# Patient Record
Sex: FEMALE | Race: WHITE | NOT HISPANIC OR LATINO | Employment: OTHER | URBAN - NONMETROPOLITAN AREA
[De-identification: names, ages, dates, MRNs, and addresses within clinical notes are randomized per-mention and may not be internally consistent; named-entity substitution may affect disease eponyms.]

---

## 2017-11-08 ENCOUNTER — HOSPITAL ENCOUNTER (INPATIENT)
Facility: HOSPITAL | Age: 57
LOS: 4 days | Discharge: HOME HEALTH | End: 2017-11-12
Attending: FAMILY MEDICINE | Admitting: FAMILY MEDICINE

## 2017-11-08 ENCOUNTER — APPOINTMENT (OUTPATIENT)
Dept: GENERAL RADIOLOGY | Facility: HOSPITAL | Age: 57
End: 2017-11-08

## 2017-11-08 ENCOUNTER — APPOINTMENT (OUTPATIENT)
Dept: ULTRASOUND IMAGING | Facility: HOSPITAL | Age: 57
End: 2017-11-08

## 2017-11-08 ENCOUNTER — APPOINTMENT (OUTPATIENT)
Dept: CARDIOLOGY | Facility: HOSPITAL | Age: 57
End: 2017-11-08
Attending: INTERNAL MEDICINE

## 2017-11-08 DIAGNOSIS — Z74.09 IMPAIRED PHYSICAL MOBILITY: ICD-10-CM

## 2017-11-08 DIAGNOSIS — R26.89 IMPAIRED GAIT AND MOBILITY: ICD-10-CM

## 2017-11-08 DIAGNOSIS — Z78.9 DECREASED ACTIVITIES OF DAILY LIVING (ADL): ICD-10-CM

## 2017-11-08 PROBLEM — R55 SYNCOPE: Status: ACTIVE | Noted: 2017-11-08

## 2017-11-08 LAB
ALBUMIN SERPL-MCNC: 3.4 G/DL (ref 3.4–4.8)
ALBUMIN/GLOB SERPL: 1.1 G/DL (ref 1.1–1.8)
ALP SERPL-CCNC: 98 U/L (ref 38–126)
ALT SERPL W P-5'-P-CCNC: 40 U/L (ref 9–52)
ANION GAP SERPL CALCULATED.3IONS-SCNC: 12 MMOL/L (ref 5–15)
ARTICHOKE IGE QN: 128 MG/DL (ref 1–129)
AST SERPL-CCNC: 34 U/L (ref 14–36)
BASOPHILS # BLD AUTO: 0.02 10*3/MM3 (ref 0–0.2)
BASOPHILS NFR BLD AUTO: 0.3 % (ref 0–2)
BH CV ECHO MEAS - % IVS THICK: 17.1 %
BH CV ECHO MEAS - % LVPW THICK: 18.2 %
BH CV ECHO MEAS - ACS: 1.9 CM
BH CV ECHO MEAS - AO MAX PG (FULL): 5.8 MMHG
BH CV ECHO MEAS - AO MAX PG: 10.3 MMHG
BH CV ECHO MEAS - AO MEAN PG (FULL): 3.6 MMHG
BH CV ECHO MEAS - AO MEAN PG: 5.8 MMHG
BH CV ECHO MEAS - AO ROOT AREA: 4.7 CM^2
BH CV ECHO MEAS - AO ROOT DIAM: 2.4 CM
BH CV ECHO MEAS - AO V2 MAX: 160.7 CM/SEC
BH CV ECHO MEAS - AO V2 MEAN: 114.6 CM/SEC
BH CV ECHO MEAS - AO V2 VTI: 41.1 CM
BH CV ECHO MEAS - AVA(I,A): 2.1 CM^2
BH CV ECHO MEAS - AVA(I,D): 2.1 CM^2
BH CV ECHO MEAS - AVA(V,A): 1.9 CM^2
BH CV ECHO MEAS - AVA(V,D): 1.9 CM^2
BH CV ECHO MEAS - EDV(CUBED): 114.1 ML
BH CV ECHO MEAS - EDV(TEICH): 110.1 ML
BH CV ECHO MEAS - EF(CUBED): 64.4 %
BH CV ECHO MEAS - EF(MOD-SP4): 60 %
BH CV ECHO MEAS - EF(TEICH): 55.8 %
BH CV ECHO MEAS - ESV(CUBED): 40.6 ML
BH CV ECHO MEAS - ESV(TEICH): 48.7 ML
BH CV ECHO MEAS - FS: 29.1 %
BH CV ECHO MEAS - IVS/LVPW: 0.91
BH CV ECHO MEAS - IVSD: 0.96 CM
BH CV ECHO MEAS - IVSS: 1.1 CM
BH CV ECHO MEAS - LA DIMENSION: 4.2 CM
BH CV ECHO MEAS - LA/AO: 1.7
BH CV ECHO MEAS - LV MASS(C)D: 174.5 GRAMS
BH CV ECHO MEAS - LV MASS(C)S: 129.6 GRAMS
BH CV ECHO MEAS - LV MAX PG: 4.5 MMHG
BH CV ECHO MEAS - LV MEAN PG: 2.1 MMHG
BH CV ECHO MEAS - LV V1 MAX: 106.5 CM/SEC
BH CV ECHO MEAS - LV V1 MEAN: 67.6 CM/SEC
BH CV ECHO MEAS - LV V1 VTI: 30 CM
BH CV ECHO MEAS - LVIDD: 4.8 CM
BH CV ECHO MEAS - LVIDS: 3.4 CM
BH CV ECHO MEAS - LVOT AREA (M): 2.8 CM^2
BH CV ECHO MEAS - LVOT AREA: 2.9 CM^2
BH CV ECHO MEAS - LVOT DIAM: 1.9 CM
BH CV ECHO MEAS - LVPWD: 1.1 CM
BH CV ECHO MEAS - LVPWS: 1.2 CM
BH CV ECHO MEAS - MV A MAX VEL: 130.6 CM/SEC
BH CV ECHO MEAS - MV DEC SLOPE: 851.1 CM/SEC^2
BH CV ECHO MEAS - MV E MAX VEL: 141.7 CM/SEC
BH CV ECHO MEAS - MV E/A: 1.1
BH CV ECHO MEAS - MV MAX PG: 8.5 MMHG
BH CV ECHO MEAS - MV MEAN PG: 5.2 MMHG
BH CV ECHO MEAS - MV P1/2T MAX VEL: 151.3 CM/SEC
BH CV ECHO MEAS - MV P1/2T: 52.1 MSEC
BH CV ECHO MEAS - MV V2 MAX: 145.4 CM/SEC
BH CV ECHO MEAS - MV V2 MEAN: 108.6 CM/SEC
BH CV ECHO MEAS - MV V2 VTI: 40.1 CM
BH CV ECHO MEAS - MVA P1/2T LCG: 1.5 CM^2
BH CV ECHO MEAS - MVA(P1/2T): 4.2 CM^2
BH CV ECHO MEAS - MVA(VTI): 2.2 CM^2
BH CV ECHO MEAS - PA MAX PG: 12.3 MMHG
BH CV ECHO MEAS - PA V2 MAX: 175.1 CM/SEC
BH CV ECHO MEAS - PI END-D VEL: 108.6 CM/SEC
BH CV ECHO MEAS - RAP SYSTOLE: 5 MMHG
BH CV ECHO MEAS - RVDD: 2.5 CM
BH CV ECHO MEAS - RVSP: 47.1 MMHG
BH CV ECHO MEAS - SV(AO): 193.1 ML
BH CV ECHO MEAS - SV(CUBED): 73.5 ML
BH CV ECHO MEAS - SV(LVOT): 88 ML
BH CV ECHO MEAS - SV(TEICH): 61.4 ML
BH CV ECHO MEAS - TR MAX VEL: 324.4 CM/SEC
BILIRUB SERPL-MCNC: 0.3 MG/DL (ref 0.2–1.3)
BUN BLD-MCNC: 38 MG/DL (ref 7–21)
BUN/CREAT SERPL: 22.5 (ref 7–25)
CA-I BLD-MCNC: 4.4 MG/DL (ref 4.5–4.9)
CALCIUM SPEC-SCNC: 8.7 MG/DL (ref 8.4–10.2)
CHLORIDE SERPL-SCNC: 109 MMOL/L (ref 95–110)
CHOLEST SERPL-MCNC: 207 MG/DL (ref 0–199)
CK SERPL-CCNC: 105 U/L (ref 30–135)
CO2 SERPL-SCNC: 22 MMOL/L (ref 22–31)
CREAT BLD-MCNC: 1.69 MG/DL (ref 0.5–1)
DEPRECATED RDW RBC AUTO: 41.1 FL (ref 36.4–46.3)
EOSINOPHIL # BLD AUTO: 0.04 10*3/MM3 (ref 0–0.7)
EOSINOPHIL NFR BLD AUTO: 0.5 % (ref 0–7)
ERYTHROCYTE [DISTWIDTH] IN BLOOD BY AUTOMATED COUNT: 13.6 % (ref 11.5–14.5)
GFR SERPL CREATININE-BSD FRML MDRD: 31 ML/MIN/1.73 (ref 51–120)
GLOBULIN UR ELPH-MCNC: 3.2 GM/DL (ref 2.3–3.5)
GLUCOSE BLD-MCNC: 84 MG/DL (ref 60–100)
GLUCOSE BLDC GLUCOMTR-MCNC: 124 MG/DL (ref 70–130)
GLUCOSE BLDC GLUCOMTR-MCNC: 70 MG/DL (ref 70–130)
GLUCOSE BLDC GLUCOMTR-MCNC: 76 MG/DL (ref 70–130)
GLUCOSE BLDC GLUCOMTR-MCNC: 94 MG/DL (ref 70–130)
HBA1C MFR BLD: 6.5 % (ref 4–5.6)
HCT VFR BLD AUTO: 32.3 % (ref 35–45)
HDLC SERPL-MCNC: 50 MG/DL (ref 60–200)
HGB BLD-MCNC: 11.1 G/DL (ref 12–15.5)
HOLD SPECIMEN: NORMAL
IMM GRANULOCYTES # BLD: 0.02 10*3/MM3 (ref 0–0.02)
IMM GRANULOCYTES NFR BLD: 0.3 % (ref 0–0.5)
INR PPP: 1.07 (ref 0.8–1.2)
LDLC/HDLC SERPL: 2.76 {RATIO} (ref 0–3.22)
LV EF 2D ECHO EST: 55 %
LYMPHOCYTES # BLD AUTO: 1.55 10*3/MM3 (ref 0.6–4.2)
LYMPHOCYTES NFR BLD AUTO: 20.5 % (ref 10–50)
MAGNESIUM SERPL-MCNC: 2 MG/DL (ref 1.6–2.3)
MAXIMAL PREDICTED HEART RATE: 161 BPM
MCH RBC QN AUTO: 28.7 PG (ref 26.5–34)
MCHC RBC AUTO-ENTMCNC: 34.4 G/DL (ref 31.4–36)
MCV RBC AUTO: 83.5 FL (ref 80–98)
MONOCYTES # BLD AUTO: 0.62 10*3/MM3 (ref 0–0.9)
MONOCYTES NFR BLD AUTO: 8.2 % (ref 0–12)
NEUTROPHILS # BLD AUTO: 5.32 10*3/MM3 (ref 2–8.6)
NEUTROPHILS NFR BLD AUTO: 70.2 % (ref 37–80)
PLATELET # BLD AUTO: 184 10*3/MM3 (ref 150–450)
PMV BLD AUTO: 10.2 FL (ref 8–12)
POTASSIUM BLD-SCNC: 4.3 MMOL/L (ref 3.5–5.1)
PROT SERPL-MCNC: 6.6 G/DL (ref 6.3–8.6)
PROTHROMBIN TIME: 13.8 SECONDS (ref 11.1–15.3)
RBC # BLD AUTO: 3.87 10*6/MM3 (ref 3.77–5.16)
SODIUM BLD-SCNC: 143 MMOL/L (ref 137–145)
STRESS TARGET HR: 137 BPM
TRIGL SERPL-MCNC: 95 MG/DL (ref 20–199)
WBC NRBC COR # BLD: 7.57 10*3/MM3 (ref 3.2–9.8)

## 2017-11-08 PROCEDURE — 93880 EXTRACRANIAL BILAT STUDY: CPT

## 2017-11-08 PROCEDURE — 25010000002 ENOXAPARIN PER 10 MG: Performed by: INTERNAL MEDICINE

## 2017-11-08 PROCEDURE — 82607 VITAMIN B-12: CPT | Performed by: FAMILY MEDICINE

## 2017-11-08 PROCEDURE — 82746 ASSAY OF FOLIC ACID SERUM: CPT | Performed by: FAMILY MEDICINE

## 2017-11-08 PROCEDURE — 85025 COMPLETE CBC W/AUTO DIFF WBC: CPT | Performed by: INTERNAL MEDICINE

## 2017-11-08 PROCEDURE — 93306 TTE W/DOPPLER COMPLETE: CPT | Performed by: INTERNAL MEDICINE

## 2017-11-08 PROCEDURE — 83735 ASSAY OF MAGNESIUM: CPT | Performed by: INTERNAL MEDICINE

## 2017-11-08 PROCEDURE — 83550 IRON BINDING TEST: CPT | Performed by: FAMILY MEDICINE

## 2017-11-08 PROCEDURE — 80053 COMPREHEN METABOLIC PANEL: CPT | Performed by: INTERNAL MEDICINE

## 2017-11-08 PROCEDURE — 82962 GLUCOSE BLOOD TEST: CPT

## 2017-11-08 PROCEDURE — 82330 ASSAY OF CALCIUM: CPT | Performed by: INTERNAL MEDICINE

## 2017-11-08 PROCEDURE — 83036 HEMOGLOBIN GLYCOSYLATED A1C: CPT | Performed by: INTERNAL MEDICINE

## 2017-11-08 PROCEDURE — 93306 TTE W/DOPPLER COMPLETE: CPT

## 2017-11-08 PROCEDURE — 71020 HC CHEST PA AND LATERAL: CPT

## 2017-11-08 PROCEDURE — 85610 PROTHROMBIN TIME: CPT | Performed by: INTERNAL MEDICINE

## 2017-11-08 PROCEDURE — 82550 ASSAY OF CK (CPK): CPT | Performed by: INTERNAL MEDICINE

## 2017-11-08 PROCEDURE — 82728 ASSAY OF FERRITIN: CPT | Performed by: FAMILY MEDICINE

## 2017-11-08 PROCEDURE — 76775 US EXAM ABDO BACK WALL LIM: CPT

## 2017-11-08 PROCEDURE — 83540 ASSAY OF IRON: CPT | Performed by: FAMILY MEDICINE

## 2017-11-08 PROCEDURE — 25010000002 HYDRALAZINE PER 20 MG: Performed by: INTERNAL MEDICINE

## 2017-11-08 PROCEDURE — 80061 LIPID PANEL: CPT | Performed by: INTERNAL MEDICINE

## 2017-11-08 RX ORDER — SODIUM CHLORIDE 0.9 % (FLUSH) 0.9 %
1-10 SYRINGE (ML) INJECTION AS NEEDED
Status: DISCONTINUED | OUTPATIENT
Start: 2017-11-08 | End: 2017-11-12 | Stop reason: HOSPADM

## 2017-11-08 RX ORDER — NICOTINE POLACRILEX 4 MG
15 LOZENGE BUCCAL
Status: DISCONTINUED | OUTPATIENT
Start: 2017-11-08 | End: 2017-11-12 | Stop reason: HOSPADM

## 2017-11-08 RX ORDER — GLYBURIDE 5 MG/1
5 TABLET ORAL
COMMUNITY

## 2017-11-08 RX ORDER — CITALOPRAM 40 MG/1
40 TABLET ORAL DAILY
Status: DISCONTINUED | OUTPATIENT
Start: 2017-11-08 | End: 2017-11-12 | Stop reason: HOSPADM

## 2017-11-08 RX ORDER — ACETAMINOPHEN 325 MG/1
650 TABLET ORAL EVERY 4 HOURS PRN
Status: DISCONTINUED | OUTPATIENT
Start: 2017-11-08 | End: 2017-11-12 | Stop reason: HOSPADM

## 2017-11-08 RX ORDER — LOSARTAN POTASSIUM 25 MG/1
25 TABLET ORAL DAILY
COMMUNITY
End: 2017-11-12 | Stop reason: HOSPADM

## 2017-11-08 RX ORDER — FAMOTIDINE 40 MG/1
40 TABLET, FILM COATED ORAL DAILY
Status: DISCONTINUED | OUTPATIENT
Start: 2017-11-08 | End: 2017-11-12 | Stop reason: HOSPADM

## 2017-11-08 RX ORDER — DEXTROSE MONOHYDRATE 25 G/50ML
25 INJECTION, SOLUTION INTRAVENOUS
Status: DISCONTINUED | OUTPATIENT
Start: 2017-11-08 | End: 2017-11-12 | Stop reason: HOSPADM

## 2017-11-08 RX ORDER — SODIUM CHLORIDE 9 MG/ML
100 INJECTION, SOLUTION INTRAVENOUS CONTINUOUS
Status: DISCONTINUED | OUTPATIENT
Start: 2017-11-08 | End: 2017-11-10

## 2017-11-08 RX ORDER — ASPIRIN 81 MG/1
81 TABLET, CHEWABLE ORAL DAILY
Status: DISCONTINUED | OUTPATIENT
Start: 2017-11-08 | End: 2017-11-12 | Stop reason: HOSPADM

## 2017-11-08 RX ORDER — FENOFIBRATE 160 MG/1
160 TABLET ORAL DAILY
COMMUNITY

## 2017-11-08 RX ORDER — LISINOPRIL 20 MG/1
20 TABLET ORAL DAILY
COMMUNITY
End: 2017-11-12 | Stop reason: HOSPADM

## 2017-11-08 RX ORDER — BISACODYL 5 MG/1
5 TABLET, DELAYED RELEASE ORAL DAILY PRN
Status: DISCONTINUED | OUTPATIENT
Start: 2017-11-08 | End: 2017-11-12 | Stop reason: HOSPADM

## 2017-11-08 RX ORDER — ONDANSETRON 2 MG/ML
4 INJECTION INTRAMUSCULAR; INTRAVENOUS EVERY 6 HOURS PRN
Status: DISCONTINUED | OUTPATIENT
Start: 2017-11-08 | End: 2017-11-12 | Stop reason: HOSPADM

## 2017-11-08 RX ORDER — HYDRALAZINE HYDROCHLORIDE 20 MG/ML
10 INJECTION INTRAMUSCULAR; INTRAVENOUS EVERY 6 HOURS PRN
Status: DISCONTINUED | OUTPATIENT
Start: 2017-11-08 | End: 2017-11-11

## 2017-11-08 RX ORDER — GLIPIZIDE 5 MG/1
5 TABLET ORAL
Status: DISCONTINUED | OUTPATIENT
Start: 2017-11-08 | End: 2017-11-12 | Stop reason: HOSPADM

## 2017-11-08 RX ORDER — CITALOPRAM 40 MG/1
40 TABLET ORAL DAILY
COMMUNITY

## 2017-11-08 RX ORDER — ASPIRIN 81 MG/1
81 TABLET, CHEWABLE ORAL DAILY
COMMUNITY

## 2017-11-08 RX ORDER — AMLODIPINE BESYLATE 5 MG/1
5 TABLET ORAL
Status: DISCONTINUED | OUTPATIENT
Start: 2017-11-08 | End: 2017-11-09

## 2017-11-08 RX ORDER — FENOFIBRATE 145 MG/1
145 TABLET, COATED ORAL DAILY
Status: DISCONTINUED | OUTPATIENT
Start: 2017-11-08 | End: 2017-11-11

## 2017-11-08 RX ADMIN — FENOFIBRATE 145 MG: 145 TABLET ORAL at 09:30

## 2017-11-08 RX ADMIN — HYDRALAZINE HYDROCHLORIDE 10 MG: 20 INJECTION INTRAMUSCULAR; INTRAVENOUS at 09:29

## 2017-11-08 RX ADMIN — SODIUM CHLORIDE 100 ML/HR: 9 INJECTION, SOLUTION INTRAVENOUS at 03:42

## 2017-11-08 RX ADMIN — AMLODIPINE BESYLATE 5 MG: 5 TABLET ORAL at 09:30

## 2017-11-08 RX ADMIN — FAMOTIDINE 40 MG: 40 TABLET ORAL at 09:30

## 2017-11-08 RX ADMIN — CITALOPRAM HYDROBROMIDE 40 MG: 40 TABLET ORAL at 09:00

## 2017-11-08 RX ADMIN — ENOXAPARIN SODIUM 30 MG: 30 INJECTION SUBCUTANEOUS at 09:30

## 2017-11-08 RX ADMIN — ASPIRIN 81 MG 81 MG: 81 TABLET ORAL at 09:30

## 2017-11-08 RX ADMIN — GLIPIZIDE 5 MG: 5 TABLET ORAL at 09:30

## 2017-11-08 RX ADMIN — ACETAMINOPHEN 650 MG: 325 TABLET ORAL at 03:42

## 2017-11-08 RX ADMIN — HYDRALAZINE HYDROCHLORIDE 10 MG: 20 INJECTION INTRAMUSCULAR; INTRAVENOUS at 03:42

## 2017-11-08 NOTE — PROGRESS NOTES
I have evaluated and examined the pt.   Currently stable.   Will follow up with the labs and imaging study.

## 2017-11-08 NOTE — PAYOR COMM NOTE
"May Diaz Rosario (57 y.o. Female)     Date of Birth Social Security Number Address Home Phone MRN    [id number]   [address]  [facility] [address] [phone] [id number]    [employer] Marital Status          None        Admission Date Admission Type Admitting Provider Attending Provider Department, Room/Bed    11/8/17 Urgent Williams Damon MD Ahmed, Farhan, MD [facility] STEPDOWN UNIT, 318/1    Discharge Date Discharge Disposition Discharge Destination                      Attending Provider: Williams Damon MD     Allergies:  Penicillins    Isolation:  None   Infection:  None   Code Status:  FULL    Ht:  65\" (165.1 cm)   Wt:  220 lb 14.4 oz (100 kg)    Admission Cmt:  None   Principal Problem:  None                Active Insurance as of 11/8/2017     Primary Coverage     Payor Plan Insurance Group Employer/Plan Group    MEDICARE MEDICARE A & B      Payor Plan Address Payor Plan Phone Number Effective From Effective To    [address] [phone] 11/1/2010     [place] [address]       Subscriber Name Subscriber Birth Date Member ID       MAY DIAZ 1960 [id number]           Secondary Coverage     Payor Plan Insurance Group Employer/Plan Group    [employer] [place]ICAID      Payor Plan Address Payor Plan Phone Number Effective From Effective To    [address] [address] [phone] 11/7/2017     [place] [address]       Subscriber Name Subscriber Birth Date Member ID       MAY DIAZ 1960 [id number]                 Emergency Contacts      (Rel.) Home Phone Work Phone Mobile Phone    Jaiden Wilburn (Mother) [phone] -- --               History & Physical      Dom Torrez MD at 11/8/2017  2:45 AM                [facility] Medicine Services  INPATIENT HISTORY AND PHYSICAL       Patient Care Team:  RONI Travis as PCP - General (Internal Medicine)    Chief complaint "   Syncope    Subjective     Patient is a 57 y.o. female with history of hypertension, depression, dyslipidemia, congestive heart failure, diabetes and obesity who was transferred from [facility] to [facility] due to syncope.    In the emergency department patient was noted to have heart rate in the low to mid 40s and was given a dose of atropine with some improvement.  Blood work showed  potassium of 6.3, negative troponin, ECG, creatinine of 2.43 and BUN of 44.  She also reportedly had a negative CT scan of the head.  She was started on IV hydration, given initial treatment for hyperkalemia and transferred to [facility] for further management.  As at the time  of my assessment patient is hemodynamically stable and her heart rate is in the 70s.    Family and social history: Patient lives alone and able to take care of her activities of daily living.  She denies history of alcohol or tobacco use.  There is family history of hypertension and diabetes.    Review of Systems   Review of Systems   Constitutional: Positive for fatigue. Negative for activity change, appetite change, chills, diaphoresis and fever.   HENT: Negative for trouble swallowing and voice change.    Eyes: Negative for photophobia and visual disturbance.   Respiratory: Negative for cough, choking, chest tightness, shortness of breath, wheezing and stridor.    Cardiovascular: Negative for chest pain, palpitations and leg swelling.   Gastrointestinal: Negative for abdominal distention, abdominal pain, blood in stool, constipation, diarrhea, nausea and vomiting.   Endocrine: Negative for cold intolerance, heat intolerance, polydipsia, polyphagia and polyuria.   Genitourinary: Negative for decreased urine volume, difficulty urinating, dysuria, enuresis, flank pain, frequency, hematuria and urgency.   Musculoskeletal: Negative for arthralgias, gait problem, myalgias, neck pain and neck stiffness.   Skin: Negative for pallor, rash  and wound.   Neurological: Positive for syncope. Negative for dizziness, tremors, seizures, facial asymmetry, speech difficulty, weakness, light-headedness, numbness and headaches.   Hematological: Does not bruise/bleed easily.   Psychiatric/Behavioral: Negative for agitation, behavioral problems and confusion.       History  Past Medical History:   Diagnosis Date   • CHF (congestive heart failure)    • Diabetes mellitus    • Hypertension      Past Surgical History:   Procedure Laterality Date   • NEPHRECTOMY Right      History reviewed. No pertinent family history.  Social History   Substance Use Topics   • Smoking status: Never Smoker   • Smokeless tobacco: None   • Alcohol use No     Prescriptions Prior to Admission   Medication Sig Dispense Refill Last Dose   • aspirin 81 MG chewable tablet Chew 81 mg Daily.   11/7/2017 at Unknown time   • citalopram (CeleXA) 40 MG tablet Take 40 mg by mouth Daily.   11/7/2017   • fenofibrate 160 MG tablet Take 160 mg by mouth Daily.   11/7/2017 at Unknown time   • glyBURIDE (DIAbeta) 5 MG tablet Take 5 mg by mouth Daily With Breakfast.   11/7/2017   • lisinopril (PRINIVIL,ZESTRIL) 20 MG tablet Take 20 mg by mouth Daily.   11/7/2017   • losartan (COZAAR) 25 MG tablet Take 25 mg by mouth Daily.   11/7/2017     Allergies:  Penicillins  Prior to Admission medications    Medication Sig Start Date End Date Taking? Authorizing Provider   aspirin 81 MG chewable tablet Chew 81 mg Daily.   Yes Historical Provider, MD   citalopram (CeleXA) 40 MG tablet Take 40 mg by mouth Daily.   Yes Historical Provider, MD   fenofibrate 160 MG tablet Take 160 mg by mouth Daily.   Yes Historical Provider, MD   glyBURIDE (DIAbeta) 5 MG tablet Take 5 mg by mouth Daily With Breakfast.   Yes Historical Provider, MD   lisinopril (PRINIVIL,ZESTRIL) 20 MG tablet Take 20 mg by mouth Daily.   Yes Historical Provider, MD   losartan (COZAAR) 25 MG tablet Take 25 mg by mouth Daily.   Yes Historical Provider, MD        Objective     Vital Signs    Temp:  [96.7 °F (35.9 °C)] 96.7 °F (35.9 °C)  Heart Rate:  [71] 71  Resp:  [18] 18  BP: (198)/(72) 198/72    Physical Exam:      Physical Exam   Constitutional: She is oriented to person, place, and time. She appears well-developed and well-nourished. She is cooperative. No distress.   HENT:   Head: Normocephalic and atraumatic.   Right Ear: External ear normal.   Left Ear: External ear normal.   Nose: Nose normal.   Mouth/Throat: Oropharynx is clear and moist.   Eyes: Conjunctivae and EOM are normal. Pupils are equal, round, and reactive to light.   Neck: Normal range of motion. Neck supple. No JVD present. No thyromegaly present.   Cardiovascular: Normal rate, regular rhythm, normal heart sounds and intact distal pulses.  Exam reveals no gallop and no friction rub.    No murmur heard.  Pulmonary/Chest: Effort normal and breath sounds normal. No stridor. No respiratory distress. She has no wheezes. She has no rales. She exhibits no tenderness.   Abdominal: Soft. Bowel sounds are normal. She exhibits no distension and no mass. There is no tenderness. There is no rebound and no guarding. No hernia.   Musculoskeletal: Normal range of motion. She exhibits no edema, tenderness or deformity.   Neurological: She is alert and oriented to person, place, and time. She has normal reflexes.   Skin: Skin is warm and dry. No rash noted. She is not diaphoretic. No erythema. No pallor.   Psychiatric: She has a normal mood and affect. Her behavior is normal. Judgment and thought content normal.   Nursing note and vitals reviewed.      Results Review:   Pending.        Invalid input(s): LABALBU, PROT                    Imaging Results (last 7 days)     ** No results found for the last 168 hours. **          Assessment/Plan   1. Syncope (likely secondary bradycardia): Patient will be admitted to a monitored bed for further diagnostic workup to include echocardiogram and bilateral carotid ultrasound.   Her heart rate has since normalized and patient is not on digoxin, calcium channel or beta blockers.  Cardiologist will be consulted if the need arises.  2.  Acute kidney injury: Patient's baseline creatinine is currently not known.  She will be commenced on IV hydration and her renal function will be routinely monitored.  We'll hold ACE inhibitor, ARB and any nephrotoxins.  We'll consult nephrologist ,check renal ultrasound and urinalysis.  3.  Hyperkalemia: Likely secondary to combination of ACE inhibitor and ARB which the patient takes.  These medications will be on hold and her potassium level will be monitored.  We'll repeat treatment for hyperkalemia in the event that potassium level remains high despite initial treatment at [facility].  4.  Diabetes mellitus: Patient will be continued on anti-glycemic, started on Accu-Cheks and sliding scale insulin.  Check hemoglobin A1c.  6.  Hypertension : Begin amlodipine and hydralazine when necessary  6.  She'll be placed on GI and DVT prophylaxis.  7.  Further diagnostic studies or treatment plan as Hospital course dictates.        I discussed the patients findings and my recommendations with patient.     Dom Torrez MD  11/08/17  2:45 AM        Total Time Spent: 55 minutes    EMR Dragon/Transcription disclaimer:   Much of this encounter note is an electronic transcription/translation of spoken language to printed text. The electronic translation of spoken language may permit erroneous, or at times, nonsensical words or phrases to be inadvertently transcribed; Although I have reviewed the note for such errors, some may still exist.                 Electronically signed by Dom Torrez MD at 11/8/2017  3:03 AM      Daphne Fowler RN   [facility]   [phone]  Fax [phone]

## 2017-11-08 NOTE — PLAN OF CARE
Problem: Patient Care Overview (Adult)  Goal: Plan of Care Review  Outcome: Ongoing (interventions implemented as appropriate)    11/08/17 0350   Coping/Psychosocial Response Interventions   Plan Of Care Reviewed With patient   Patient Care Overview   Progress no change   Outcome Evaluation   Outcome Summary/Follow up Plan Pt current HR-SR, Pt denies any JUDITH or being dizzy       Goal: Discharge Needs Assessment  Outcome: Ongoing (interventions implemented as appropriate)    11/08/17 0033 11/08/17 0350   Discharge Needs Assessment   Concerns To Be Addressed --  no discharge needs identified   Readmission Within The Last 30 Days --  no previous admission in last 30 days   Discharge Disposition --  still a patient   Self-Care   Equipment Currently Used at Home none --          Problem: Arrhythmia/Dysrhythmia (Symptomatic) (Adult)  Intervention: Prevent/Manage Thrombi/Emboli    11/08/17 0350   Safety Interventions   Bleeding Precautions blood pressure closely monitored;monitored for signs of bleeding   Activity   Activity Type activity adjusted per tolerance       Intervention: Promote Hemodynamic Stability    11/08/17 0350   Nutrition Interventions   Fluid/Electrolyte Management intravenous fluid replacement initiated   Positioning   Head Of Bed (HOB) Position HOB elevated         Goal: Signs and Symptoms of Listed Potential Problems Will be Absent or Manageable (Arrhythmia/Dysrhythmia)  Outcome: Ongoing (interventions implemented as appropriate)    11/08/17 0350   Arrhythmia/Dysrhythmia (Symptomatic)   Problems Assessed (Arrhythmia/Dysrhythmia) all   Problems Present (Arrhythmia/Dysrhythmia) none

## 2017-11-08 NOTE — H&P
[facility] Medicine Services  INPATIENT HISTORY AND PHYSICAL       Patient Care Team:  RONI Travis as PCP - General (Internal Medicine)    Chief complaint   Syncope    Subjective     Patient is a 57 y.o. female with history of hypertension, depression, dyslipidemia, congestive heart failure, diabetes and obesity who was transferred from [facility] to [facility] due to syncope.    In the emergency department patient was noted to have heart rate in the low to mid 40s and was given a dose of atropine with some improvement.  Blood work showed  potassium of 6.3, negative troponin, ECG, creatinine of 2.43 and BUN of 44.  She also reportedly had a negative CT scan of the head.  She was started on IV hydration, given initial treatment for hyperkalemia and transferred to [facility] for further management.  As at the time  of my assessment patient is hemodynamically stable and her heart rate is in the 70s.    Family and social history: Patient lives alone and able to take care of her activities of daily living.  She denies history of alcohol or tobacco use.  There is family history of hypertension and diabetes.    Review of Systems   Review of Systems   Constitutional: Positive for fatigue. Negative for activity change, appetite change, chills, diaphoresis and fever.   HENT: Negative for trouble swallowing and voice change.    Eyes: Negative for photophobia and visual disturbance.   Respiratory: Negative for cough, choking, chest tightness, shortness of breath, wheezing and stridor.    Cardiovascular: Negative for chest pain, palpitations and leg swelling.   Gastrointestinal: Negative for abdominal distention, abdominal pain, blood in stool, constipation, diarrhea, nausea and vomiting.   Endocrine: Negative for cold intolerance, heat intolerance, polydipsia, polyphagia and polyuria.   Genitourinary: Negative for decreased urine volume, difficulty  urinating, dysuria, enuresis, flank pain, frequency, hematuria and urgency.   Musculoskeletal: Negative for arthralgias, gait problem, myalgias, neck pain and neck stiffness.   Skin: Negative for pallor, rash and wound.   Neurological: Positive for syncope. Negative for dizziness, tremors, seizures, facial asymmetry, speech difficulty, weakness, light-headedness, numbness and headaches.   Hematological: Does not bruise/bleed easily.   Psychiatric/Behavioral: Negative for agitation, behavioral problems and confusion.       History  Past Medical History:   Diagnosis Date   • CHF (congestive heart failure)    • Diabetes mellitus    • Hypertension      Past Surgical History:   Procedure Laterality Date   • NEPHRECTOMY Right      History reviewed. No pertinent family history.  Social History   Substance Use Topics   • Smoking status: Never Smoker   • Smokeless tobacco: None   • Alcohol use No     Prescriptions Prior to Admission   Medication Sig Dispense Refill Last Dose   • aspirin 81 MG chewable tablet Chew 81 mg Daily.   11/7/2017 at Unknown time   • citalopram (CeleXA) 40 MG tablet Take 40 mg by mouth Daily.   11/7/2017   • fenofibrate 160 MG tablet Take 160 mg by mouth Daily.   11/7/2017 at Unknown time   • glyBURIDE (DIAbeta) 5 MG tablet Take 5 mg by mouth Daily With Breakfast.   11/7/2017   • lisinopril (PRINIVIL,ZESTRIL) 20 MG tablet Take 20 mg by mouth Daily.   11/7/2017   • losartan (COZAAR) 25 MG tablet Take 25 mg by mouth Daily.   11/7/2017     Allergies:  Penicillins  Prior to Admission medications    Medication Sig Start Date End Date Taking? Authorizing Provider   aspirin 81 MG chewable tablet Chew 81 mg Daily.   Yes Historical Provider, MD   citalopram (CeleXA) 40 MG tablet Take 40 mg by mouth Daily.   Yes Historical Provider, MD   fenofibrate 160 MG tablet Take 160 mg by mouth Daily.   Yes Historical Provider, MD   glyBURIDE (DIAbeta) 5 MG tablet Take 5 mg by mouth Daily With Breakfast.   Yes Historical  Provider, MD   lisinopril (PRINIVIL,ZESTRIL) 20 MG tablet Take 20 mg by mouth Daily.   Yes Historical Provider, MD   losartan (COZAAR) 25 MG tablet Take 25 mg by mouth Daily.   Yes Historical Provider, MD       Objective     Vital Signs    Temp:  [96.7 °F (35.9 °C)] 96.7 °F (35.9 °C)  Heart Rate:  [71] 71  Resp:  [18] 18  BP: (198)/(72) 198/72    Physical Exam:      Physical Exam   Constitutional: She is oriented to person, place, and time. She appears well-developed and well-nourished. She is cooperative. No distress.   HENT:   Head: Normocephalic and atraumatic.   Right Ear: External ear normal.   Left Ear: External ear normal.   Nose: Nose normal.   Mouth/Throat: Oropharynx is clear and moist.   Eyes: Conjunctivae and EOM are normal. Pupils are equal, round, and reactive to light.   Neck: Normal range of motion. Neck supple. No JVD present. No thyromegaly present.   Cardiovascular: Normal rate, regular rhythm, normal heart sounds and intact distal pulses.  Exam reveals no gallop and no friction rub.    No murmur heard.  Pulmonary/Chest: Effort normal and breath sounds normal. No stridor. No respiratory distress. She has no wheezes. She has no rales. She exhibits no tenderness.   Abdominal: Soft. Bowel sounds are normal. She exhibits no distension and no mass. There is no tenderness. There is no rebound and no guarding. No hernia.   Musculoskeletal: Normal range of motion. She exhibits no edema, tenderness or deformity.   Neurological: She is alert and oriented to person, place, and time. She has normal reflexes.   Skin: Skin is warm and dry. No rash noted. She is not diaphoretic. No erythema. No pallor.   Psychiatric: She has a normal mood and affect. Her behavior is normal. Judgment and thought content normal.   Nursing note and vitals reviewed.      Results Review:   Pending.        Invalid input(s): LABALBU, PROT                    Imaging Results (last 7 days)     ** No results found for the last 168 hours.  **          Assessment/Plan   1. Syncope (likely secondary bradycardia): Patient will be admitted to a monitored bed for further diagnostic workup to include echocardiogram and bilateral carotid ultrasound.  Her heart rate has since normalized and patient is not on digoxin, calcium channel or beta blockers.  Cardiologist will be consulted if the need arises.  2.  Acute kidney injury: Patient's baseline creatinine is currently not known.  She will be commenced on IV hydration and her renal function will be routinely monitored.  We'll hold ACE inhibitor, ARB and any nephrotoxins.  We'll consult nephrologist ,check renal ultrasound and urinalysis.  3.  Hyperkalemia: Likely secondary to combination of ACE inhibitor and ARB which the patient takes.  These medications will be on hold and her potassium level will be monitored.  We'll repeat treatment for hyperkalemia in the event that potassium level remains high despite initial treatment at [facility].  4.  Diabetes mellitus: Patient will be continued on anti-glycemic, started on Accu-Cheks and sliding scale insulin.  Check hemoglobin A1c.  6.  Hypertension : Begin amlodipine and hydralazine when necessary  6.  She'll be placed on GI and DVT prophylaxis.  7.  Further diagnostic studies or treatment plan as Hospital course dictates.        I discussed the patients findings and my recommendations with patient.     Dom Torrez MD  11/08/17  2:45 AM        Total Time Spent: 55 minutes    EMR Dragon/Transcription disclaimer:   Much of this encounter note is an electronic transcription/translation of spoken language to printed text. The electronic translation of spoken language may permit erroneous, or at times, nonsensical words or phrases to be inadvertently transcribed; Although I have reviewed the note for such errors, some may still exist.

## 2017-11-09 LAB
ANION GAP SERPL CALCULATED.3IONS-SCNC: 9 MMOL/L (ref 5–15)
BASOPHILS # BLD AUTO: 0.03 10*3/MM3 (ref 0–0.2)
BASOPHILS NFR BLD AUTO: 0.6 % (ref 0–2)
BUN BLD-MCNC: 30 MG/DL (ref 7–21)
BUN/CREAT SERPL: 24.4 (ref 7–25)
CALCIUM SPEC-SCNC: 8.2 MG/DL (ref 8.4–10.2)
CHLORIDE SERPL-SCNC: 114 MMOL/L (ref 95–110)
CO2 SERPL-SCNC: 21 MMOL/L (ref 22–31)
CREAT BLD-MCNC: 1.23 MG/DL (ref 0.5–1)
DEPRECATED RDW RBC AUTO: 45.1 FL (ref 36.4–46.3)
EOSINOPHIL # BLD AUTO: 0.08 10*3/MM3 (ref 0–0.7)
EOSINOPHIL NFR BLD AUTO: 1.5 % (ref 0–7)
ERYTHROCYTE [DISTWIDTH] IN BLOOD BY AUTOMATED COUNT: 14 % (ref 11.5–14.5)
GFR SERPL CREATININE-BSD FRML MDRD: 45 ML/MIN/1.73 (ref 51–120)
GLUCOSE BLD-MCNC: 98 MG/DL (ref 60–100)
GLUCOSE BLDC GLUCOMTR-MCNC: 140 MG/DL (ref 70–130)
GLUCOSE BLDC GLUCOMTR-MCNC: 75 MG/DL (ref 70–130)
GLUCOSE BLDC GLUCOMTR-MCNC: 98 MG/DL (ref 70–130)
HCT VFR BLD AUTO: 31 % (ref 35–45)
HGB BLD-MCNC: 10.3 G/DL (ref 12–15.5)
IMM GRANULOCYTES # BLD: 0.01 10*3/MM3 (ref 0–0.02)
IMM GRANULOCYTES NFR BLD: 0.2 % (ref 0–0.5)
LYMPHOCYTES # BLD AUTO: 1.27 10*3/MM3 (ref 0.6–4.2)
LYMPHOCYTES NFR BLD AUTO: 24.6 % (ref 10–50)
MCH RBC QN AUTO: 28.9 PG (ref 26.5–34)
MCHC RBC AUTO-ENTMCNC: 33.2 G/DL (ref 31.4–36)
MCV RBC AUTO: 87.1 FL (ref 80–98)
MONOCYTES # BLD AUTO: 0.51 10*3/MM3 (ref 0–0.9)
MONOCYTES NFR BLD AUTO: 9.9 % (ref 0–12)
NEUTROPHILS # BLD AUTO: 3.27 10*3/MM3 (ref 2–8.6)
NEUTROPHILS NFR BLD AUTO: 63.2 % (ref 37–80)
NRBC BLD MANUAL-RTO: 0 /100 WBC (ref 0–0)
PLATELET # BLD AUTO: 183 10*3/MM3 (ref 150–450)
PMV BLD AUTO: 9.8 FL (ref 8–12)
POTASSIUM BLD-SCNC: 4.4 MMOL/L (ref 3.5–5.1)
RBC # BLD AUTO: 3.56 10*6/MM3 (ref 3.77–5.16)
SODIUM BLD-SCNC: 144 MMOL/L (ref 137–145)
WBC NRBC COR # BLD: 5.17 10*3/MM3 (ref 3.2–9.8)

## 2017-11-09 PROCEDURE — 25010000002 HYDRALAZINE PER 20 MG: Performed by: INTERNAL MEDICINE

## 2017-11-09 PROCEDURE — 25010000002 ENOXAPARIN PER 10 MG: Performed by: INTERNAL MEDICINE

## 2017-11-09 PROCEDURE — 80048 BASIC METABOLIC PNL TOTAL CA: CPT | Performed by: INTERNAL MEDICINE

## 2017-11-09 PROCEDURE — 82962 GLUCOSE BLOOD TEST: CPT

## 2017-11-09 PROCEDURE — 85025 COMPLETE CBC W/AUTO DIFF WBC: CPT | Performed by: INTERNAL MEDICINE

## 2017-11-09 RX ORDER — AMLODIPINE BESYLATE 10 MG/1
10 TABLET ORAL
Status: DISCONTINUED | OUTPATIENT
Start: 2017-11-10 | End: 2017-11-12 | Stop reason: HOSPADM

## 2017-11-09 RX ORDER — HYDRALAZINE HYDROCHLORIDE 25 MG/1
25 TABLET, FILM COATED ORAL EVERY 8 HOURS SCHEDULED
Status: DISCONTINUED | OUTPATIENT
Start: 2017-11-09 | End: 2017-11-10

## 2017-11-09 RX ADMIN — ENOXAPARIN SODIUM 30 MG: 30 INJECTION SUBCUTANEOUS at 08:38

## 2017-11-09 RX ADMIN — HYDRALAZINE HYDROCHLORIDE 10 MG: 20 INJECTION INTRAMUSCULAR; INTRAVENOUS at 20:40

## 2017-11-09 RX ADMIN — HYDRALAZINE HYDROCHLORIDE 25 MG: 25 TABLET, FILM COATED ORAL at 14:37

## 2017-11-09 RX ADMIN — FAMOTIDINE 40 MG: 40 TABLET ORAL at 08:38

## 2017-11-09 RX ADMIN — ASPIRIN 81 MG 81 MG: 81 TABLET ORAL at 08:42

## 2017-11-09 RX ADMIN — HYDRALAZINE HYDROCHLORIDE 25 MG: 25 TABLET, FILM COATED ORAL at 21:12

## 2017-11-09 RX ADMIN — AMLODIPINE BESYLATE 5 MG: 5 TABLET ORAL at 08:38

## 2017-11-09 RX ADMIN — HYDRALAZINE HYDROCHLORIDE 10 MG: 20 INJECTION INTRAMUSCULAR; INTRAVENOUS at 00:31

## 2017-11-09 RX ADMIN — HYDRALAZINE HYDROCHLORIDE 10 MG: 20 INJECTION INTRAMUSCULAR; INTRAVENOUS at 11:11

## 2017-11-09 RX ADMIN — GLIPIZIDE 5 MG: 5 TABLET ORAL at 08:38

## 2017-11-09 RX ADMIN — FENOFIBRATE 145 MG: 145 TABLET ORAL at 08:38

## 2017-11-09 RX ADMIN — CITALOPRAM HYDROBROMIDE 40 MG: 40 TABLET ORAL at 08:38

## 2017-11-09 NOTE — PROGRESS NOTES
[facility] Medicine Services  INPATIENT PROGRESS NOTE    Length of Stay: 1  Date of Admission: 11/8/2017  Primary Care Physician: RONI Travis   No chief complaint on file.  Syncope    HPI:  57-year-old female with history of hypertension, depression, hyperlipidemia, numbness or failure, diabetes and obesity who was [facility] for syncope and bradycardia patient was found to have a heart rate in low to mid 40s.  Patient was given dose of atropine with some improvement.    11/9/2017: Patient heart rate has been in 70s the whole time she is been here.  She has been stopped on her beta blocker.  Patient state that she become dizzy as she get out of bed however it improves once she hold on to something and take her time before she moved.    Review of Systems   HENT: Negative for rhinorrhea.    Respiratory: Negative for chest tightness, shortness of breath and wheezing.    Cardiovascular: Negative for chest pain, palpitations and leg swelling.   Genitourinary: Negative for dysuria and urgency.   Musculoskeletal: Negative for back pain.   Neurological: Positive for dizziness. Negative for light-headedness.   Hematological: Negative for adenopathy. Does not bruise/bleed easily.   Psychiatric/Behavioral: Negative for agitation and behavioral problems.        All pertinent negatives and positives are as above. All other systems have been reviewed and are negative unless otherwise stated.     Objective      Vital Sign Min/Max for last 24 hours  Temp  Min: 98 °F (36.7 °C)  Max: 98.7 °F (37.1 °C)   BP  Min: 153/67  Max: 225/96   Pulse  Min: 74  Max: 78   Resp  Min: 16  Max: 18   SpO2  Min: 94 %  Max: 98 %   No Data Recorded   No Data Recorded         Physical Exam   Constitutional: She is oriented to person, place, and time. She appears well-developed and well-nourished.   HENT:   Head: Normocephalic and atraumatic.   Eyes: EOM are normal. Pupils  are equal, round, and reactive to light.   Neck: Normal range of motion.   Cardiovascular: Normal rate, regular rhythm and normal heart sounds.    Pulmonary/Chest: Effort normal and breath sounds normal.   Abdominal: Soft. Bowel sounds are normal.   Musculoskeletal: Normal range of motion.   Neurological: She is alert and oriented to person, place, and time.   Skin: Skin is warm.   Psychiatric: She has a normal mood and affect. Her behavior is normal.   Vitals reviewed.      Current Facility-Administered Medications   Medication Dose Route Frequency Provider Last Rate Last Dose   • acetaminophen (TYLENOL) tablet 650 mg  650 mg Oral Q4H PRN Dom Torrez MD   650 mg at 11/08/17 0342   • [START ON 11/10/2017] amLODIPine (NORVASC) tablet 10 mg  10 mg Oral Q24H Williams Damon MD       • aspirin chewable tablet 81 mg  81 mg Oral Daily Dom Torrez MD   81 mg at 11/09/17 0842   • bisacodyl (DULCOLAX) EC tablet 5 mg  5 mg Oral Daily PRN Dom Torrez MD       • citalopram (CeleXA) tablet 40 mg  40 mg Oral Daily Dom Torrez MD   40 mg at 11/09/17 0838   • dextrose (D50W) solution 25 g  25 g Intravenous Q15 Min PRN Dom Torrez MD       • dextrose (GLUTOSE) oral gel 15 g  15 g Oral Q15 Min PRN Dom Torrez MD       • enoxaparin (LOVENOX) syringe 30 mg  30 mg Subcutaneous Daily Dom Torrez MD   30 mg at 11/09/17 0838   • famotidine (PEPCID) tablet 40 mg  40 mg Oral Daily Dom Torrez MD   40 mg at 11/09/17 0838   • fenofibrate (TRICOR) tablet 145 mg  145 mg Oral Daily Dom Torrez MD   145 mg at 11/09/17 0838   • glipiZIDE (GLUCOTROL) tablet 5 mg  5 mg Oral QAM AC Dom Torrez MD   5 mg at 11/09/17 0838   • glucagon (human recombinant) (GLUCAGEN DIAGNOSTIC) injection 1 mg  1 mg Subcutaneous Q15 Min PRN Dom Torrez MD       • hydrALAZINE (APRESOLINE) injection 10 mg  10 mg Intravenous Q6H PRN Dom Torrez MD   10 mg at 11/09/17 1111   • hydrALAZINE  (APRESOLINE) tablet 25 mg  25 mg Oral Q8H Williams Damon MD       • insulin aspart (novoLOG) injection 0-9 Units  0-9 Units Subcutaneous 4x Daily AC & at Bedtime Dom Torrez MD   Stopped at 11/08/17 0730   • ondansetron (ZOFRAN) injection 4 mg  4 mg Intravenous Q6H PRN Dom Torrez MD       • sodium chloride 0.9 % flush 1-10 mL  1-10 mL Intravenous PRN Dom Torrez MD       • sodium chloride 0.9 % infusion  100 mL/hr Intravenous Continuous Dom Torrez  mL/hr at 11/08/17 0342 100 mL/hr at 11/08/17 0342           Results Review:  I have reviewed the labs, radiology results, and diagnostic studies.    Laboratory Data:     Results from last 7 days  Lab Units 11/09/17  0547 11/08/17  0302   SODIUM mmol/L 144 143   POTASSIUM mmol/L 4.4 4.3   CHLORIDE mmol/L 114* 109   CO2 mmol/L 21.0* 22.0   BUN mg/dL 30* 38*   CREATININE mg/dL 1.23* 1.69*   GLUCOSE mg/dL 98 84   CALCIUM mg/dL 8.2* 8.7   BILIRUBIN mg/dL  --  0.3   ALK PHOS U/L  --  98   ALT (SGPT) U/L  --  40   AST (SGOT) U/L  --  34   ANION GAP mmol/L 9.0 12.0     Estimated Creatinine Clearance: 57.7 mL/min (by C-G formula based on Cr of 1.23).    Results from last 7 days  Lab Units 11/08/17  0302   MAGNESIUM mg/dL 2.0           Results from last 7 days  Lab Units 11/09/17  0547 11/08/17  0302   WBC 10*3/mm3 5.17 7.57   HEMOGLOBIN g/dL 10.3* 11.1*   HEMATOCRIT % 31.0* 32.3*   PLATELETS 10*3/mm3 183 184       Results from last 7 days  Lab Units 11/08/17  0302   INR  1.07           Culture Data:   No results found for: BLOODCX  No results found for: URINECX  No results found for: RESPCX  No results found for: WOUNDCX  No results found for: STOOLCX  No components found for: BODYFLD       Radiology Data:   Imaging Results (last 24 hours)     ** No results found for the last 24 hours. **          I have reviewed the patient current medications.     Assessment/Plan     Active Hospital Problems (** Indicates Principal Problem)    Diagnosis Date  Noted   • Syncope [R55] 11/08/2017      Resolved Hospital Problems    Diagnosis Date Noted Date Resolved   No resolved problems to display.     #1.  Syncope likely secondary to the bradycardia: Patient heart rate has been in 60s and 70s all times she is in the hospital.  We'll continue to monitor.  Her echocardiogram was normal, her carotid ultrasound showed right sided 50-69% occlusion.  We'll obtain orthostatic vitals.  #2.  Acute kidney injury: Creatinine has improved to 1.23.  Ace inhibitors are on hold.  We'll add hydralazine for the blood pressure management.  #3.  Hyperkalemia: Resolved.  #4.  Essential hypertension: We'll increase amlodipine to 10 mg, also add hydralazine 25 mg 3 times a day.  #5.  Diabetes mellitus: A1c is 6.5, continue with the sliding scale.      Discharge Planning: I expect patient to be discharged to home in 1-2 days.      DVT Prophylaxis: SCD/TEDs               This document has been electronically signed by Williams Damon MD on November 9, 2017 12:51 PM

## 2017-11-09 NOTE — PLAN OF CARE
Problem: Patient Care Overview (Adult)  Goal: Plan of Care Review  Outcome: Ongoing (interventions implemented as appropriate)    11/09/17 0319   Coping/Psychosocial Response Interventions   Plan Of Care Reviewed With patient   Patient Care Overview   Progress improving       Goal: Adult Individualization and Mutuality  Outcome: Ongoing (interventions implemented as appropriate)  Goal: Discharge Needs Assessment  Outcome: Ongoing (interventions implemented as appropriate)    Problem: Arrhythmia/Dysrhythmia (Symptomatic) (Adult)  Goal: Signs and Symptoms of Listed Potential Problems Will be Absent or Manageable (Arrhythmia/Dysrhythmia)  Outcome: Ongoing (interventions implemented as appropriate)

## 2017-11-09 NOTE — SIGNIFICANT NOTE
11/09/17 1514   Rehab Treatment   Discipline physical therapist   Rehab Evaluation   Evaluation Not Performed other (see comments)  (Attempted PT eval - however, BP still 205/86 - therefore, evaluation not completed.  Will check on pt tomorrow.)   Recommendation   PT - Next Appointment 11/10/17

## 2017-11-10 LAB
ANION GAP SERPL CALCULATED.3IONS-SCNC: 7 MMOL/L (ref 5–15)
BASOPHILS # BLD AUTO: 0.03 10*3/MM3 (ref 0–0.2)
BASOPHILS NFR BLD AUTO: 0.5 % (ref 0–2)
BUN BLD-MCNC: 27 MG/DL (ref 7–21)
BUN/CREAT SERPL: 23.1 (ref 7–25)
CALCIUM SPEC-SCNC: 8.2 MG/DL (ref 8.4–10.2)
CHLORIDE SERPL-SCNC: 110 MMOL/L (ref 95–110)
CO2 SERPL-SCNC: 22 MMOL/L (ref 22–31)
CREAT BLD-MCNC: 1.17 MG/DL (ref 0.5–1)
DEPRECATED RDW RBC AUTO: 41.5 FL (ref 36.4–46.3)
EOSINOPHIL # BLD AUTO: 0.12 10*3/MM3 (ref 0–0.7)
EOSINOPHIL NFR BLD AUTO: 2 % (ref 0–7)
ERYTHROCYTE [DISTWIDTH] IN BLOOD BY AUTOMATED COUNT: 13.5 % (ref 11.5–14.5)
GFR SERPL CREATININE-BSD FRML MDRD: 47 ML/MIN/1.73 (ref 51–120)
GLUCOSE BLD-MCNC: 106 MG/DL (ref 60–100)
GLUCOSE BLDC GLUCOMTR-MCNC: 118 MG/DL (ref 70–130)
GLUCOSE BLDC GLUCOMTR-MCNC: 301 MG/DL (ref 70–130)
HCT VFR BLD AUTO: 31.4 % (ref 35–45)
HGB BLD-MCNC: 10.5 G/DL (ref 12–15.5)
IMM GRANULOCYTES # BLD: 0.02 10*3/MM3 (ref 0–0.02)
IMM GRANULOCYTES NFR BLD: 0.3 % (ref 0–0.5)
LYMPHOCYTES # BLD AUTO: 1.51 10*3/MM3 (ref 0.6–4.2)
LYMPHOCYTES NFR BLD AUTO: 25.2 % (ref 10–50)
MCH RBC QN AUTO: 28.3 PG (ref 26.5–34)
MCHC RBC AUTO-ENTMCNC: 33.4 G/DL (ref 31.4–36)
MCV RBC AUTO: 84.6 FL (ref 80–98)
MONOCYTES # BLD AUTO: 0.68 10*3/MM3 (ref 0–0.9)
MONOCYTES NFR BLD AUTO: 11.3 % (ref 0–12)
NEUTROPHILS # BLD AUTO: 3.64 10*3/MM3 (ref 2–8.6)
NEUTROPHILS NFR BLD AUTO: 60.7 % (ref 37–80)
PLATELET # BLD AUTO: 178 10*3/MM3 (ref 150–450)
PMV BLD AUTO: 10.6 FL (ref 8–12)
POTASSIUM BLD-SCNC: 4.3 MMOL/L (ref 3.5–5.1)
RBC # BLD AUTO: 3.71 10*6/MM3 (ref 3.77–5.16)
SODIUM BLD-SCNC: 139 MMOL/L (ref 137–145)
WBC NRBC COR # BLD: 6 10*3/MM3 (ref 3.2–9.8)

## 2017-11-10 PROCEDURE — 25010000002 HYDRALAZINE PER 20 MG: Performed by: INTERNAL MEDICINE

## 2017-11-10 PROCEDURE — G8978 MOBILITY CURRENT STATUS: HCPCS

## 2017-11-10 PROCEDURE — 85025 COMPLETE CBC W/AUTO DIFF WBC: CPT | Performed by: INTERNAL MEDICINE

## 2017-11-10 PROCEDURE — 97166 OT EVAL MOD COMPLEX 45 MIN: CPT

## 2017-11-10 PROCEDURE — 97162 PT EVAL MOD COMPLEX 30 MIN: CPT

## 2017-11-10 PROCEDURE — 63710000001 INSULIN ASPART PER 5 UNITS: Performed by: INTERNAL MEDICINE

## 2017-11-10 PROCEDURE — G8979 MOBILITY GOAL STATUS: HCPCS

## 2017-11-10 PROCEDURE — G8987 SELF CARE CURRENT STATUS: HCPCS

## 2017-11-10 PROCEDURE — G8988 SELF CARE GOAL STATUS: HCPCS

## 2017-11-10 PROCEDURE — 80048 BASIC METABOLIC PNL TOTAL CA: CPT | Performed by: INTERNAL MEDICINE

## 2017-11-10 PROCEDURE — 82962 GLUCOSE BLOOD TEST: CPT

## 2017-11-10 PROCEDURE — 25010000002 ENOXAPARIN PER 10 MG: Performed by: INTERNAL MEDICINE

## 2017-11-10 RX ORDER — MIDODRINE HYDROCHLORIDE 2.5 MG/1
2.5 TABLET ORAL
Status: DISCONTINUED | OUTPATIENT
Start: 2017-11-10 | End: 2017-11-11

## 2017-11-10 RX ORDER — LABETALOL 100 MG/1
100 TABLET, FILM COATED ORAL EVERY 12 HOURS SCHEDULED
Status: DISCONTINUED | OUTPATIENT
Start: 2017-11-10 | End: 2017-11-12

## 2017-11-10 RX ADMIN — ASPIRIN 81 MG 81 MG: 81 TABLET ORAL at 09:00

## 2017-11-10 RX ADMIN — LABETALOL HYDROCHLORIDE 100 MG: 100 TABLET, FILM COATED ORAL at 20:08

## 2017-11-10 RX ADMIN — HYDRALAZINE HYDROCHLORIDE 25 MG: 25 TABLET, FILM COATED ORAL at 05:36

## 2017-11-10 RX ADMIN — CITALOPRAM HYDROBROMIDE 40 MG: 40 TABLET ORAL at 08:23

## 2017-11-10 RX ADMIN — GLIPIZIDE 5 MG: 5 TABLET ORAL at 06:33

## 2017-11-10 RX ADMIN — AMLODIPINE BESYLATE 10 MG: 10 TABLET ORAL at 08:23

## 2017-11-10 RX ADMIN — HYDRALAZINE HYDROCHLORIDE 10 MG: 20 INJECTION INTRAMUSCULAR; INTRAVENOUS at 04:26

## 2017-11-10 RX ADMIN — MIDODRINE HYDROCHLORIDE 2.5 MG: 2.5 TABLET ORAL at 17:30

## 2017-11-10 RX ADMIN — ENOXAPARIN SODIUM 30 MG: 30 INJECTION SUBCUTANEOUS at 08:23

## 2017-11-10 RX ADMIN — SODIUM CHLORIDE 100 ML/HR: 9 INJECTION, SOLUTION INTRAVENOUS at 05:17

## 2017-11-10 RX ADMIN — FAMOTIDINE 40 MG: 40 TABLET ORAL at 08:23

## 2017-11-10 RX ADMIN — INSULIN ASPART 7 UNITS: 100 INJECTION, SOLUTION INTRAVENOUS; SUBCUTANEOUS at 20:10

## 2017-11-10 RX ADMIN — FENOFIBRATE 145 MG: 145 TABLET ORAL at 08:23

## 2017-11-10 NOTE — SIGNIFICANT NOTE
Per RN, pt. BP had been stable. Eval was completed and BP was extremely elevated after checking 3 times. Pt. Was told to lay back down and RN was notified. Would hold any further eval/treatment at this time. NN- 11/10

## 2017-11-10 NOTE — PLAN OF CARE
Problem: Patient Care Overview (Adult)  Goal: Adult Individualization and Mutuality  Outcome: Ongoing (interventions implemented as appropriate)  Goal: Discharge Needs Assessment  Outcome: Ongoing (interventions implemented as appropriate)    Problem: Arrhythmia/Dysrhythmia (Symptomatic) (Adult)  Goal: Signs and Symptoms of Listed Potential Problems Will be Absent or Manageable (Arrhythmia/Dysrhythmia)  Outcome: Ongoing (interventions implemented as appropriate)

## 2017-11-10 NOTE — PROGRESS NOTES
[facility] Medicine Services  INPATIENT PROGRESS NOTE    Length of Stay: 2  Date of Admission: 11/8/2017  Primary Care Physician: RONI Travis    Subjective   No chief complaint on file.  Syncope    HPI:  57-year-old female with history of hypertension, depression, hyperlipidemia, numbness or failure, diabetes and obesity who was [facility] for syncope and bradycardia patient was found to have a heart rate in low to mid 40s.  Patient was given dose of atropine with some improvement.    11/9/2017: Patient heart rate has been in 70s the whole time she is been here.  She has been stopped on her beta blocker.  Patient state that she become dizzy as she get out of bed however it improves once she hold on to something and take her time before she moved.    11/10/2017: Pt had episode where her BP is raising up to 200s, pt was not able to ambulate due to elevated BP. She positive for orthostatic hypotension.     Review of Systems   HENT: Negative for rhinorrhea.    Respiratory: Negative for chest tightness, shortness of breath and wheezing.    Cardiovascular: Negative for chest pain, palpitations and leg swelling.   Genitourinary: Negative for dysuria and urgency.   Musculoskeletal: Negative for back pain.   Neurological: Positive for dizziness. Negative for light-headedness.   Hematological: Negative for adenopathy. Does not bruise/bleed easily.   Psychiatric/Behavioral: Negative for agitation and behavioral problems.        All pertinent negatives and positives are as above. All other systems have been reviewed and are negative unless otherwise stated.     Objective      Vital Sign Min/Max for last 24 hours  Temp  Min: 97.4 °F (36.3 °C)  Max: 98.3 °F (36.8 °C)   BP  Min: 132/74  Max: 199/77   Pulse  Min: 71  Max: 88   Resp  Min: 18  Max: 18   SpO2  Min: 97 %  Max: 99 %   No Data Recorded   Weight  Min: 217 lb 3.2 oz (98.5 kg)  Max: 217 lb 3.2 oz (98.5  kg)         Physical Exam   Constitutional: She is oriented to person, place, and time. She appears well-developed and well-nourished.   HENT:   Head: Normocephalic and atraumatic.   Eyes: EOM are normal. Pupils are equal, round, and reactive to light.   Neck: Normal range of motion.   Cardiovascular: Normal rate, regular rhythm and normal heart sounds.    Pulmonary/Chest: Effort normal and breath sounds normal.   Abdominal: Soft. Bowel sounds are normal.   Musculoskeletal: Normal range of motion.   Neurological: She is alert and oriented to person, place, and time.   Skin: Skin is warm.   Psychiatric: She has a normal mood and affect. Her behavior is normal.   Vitals reviewed.      Current Facility-Administered Medications   Medication Dose Route Frequency Provider Last Rate Last Dose   • acetaminophen (TYLENOL) tablet 650 mg  650 mg Oral Q4H PRN Dom Torrez MD   650 mg at 11/08/17 0342   • amLODIPine (NORVASC) tablet 10 mg  10 mg Oral Q24H Williams Damon MD   10 mg at 11/10/17 0823   • aspirin chewable tablet 81 mg  81 mg Oral Daily Dom Torrez MD   81 mg at 11/10/17 0900   • bisacodyl (DULCOLAX) EC tablet 5 mg  5 mg Oral Daily PRN oDm Torrez MD       • citalopram (CeleXA) tablet 40 mg  40 mg Oral Daily Dom Torrez MD   40 mg at 11/10/17 0823   • dextrose (D50W) solution 25 g  25 g Intravenous Q15 Min PRN Dom Torrez MD       • dextrose (GLUTOSE) oral gel 15 g  15 g Oral Q15 Min PRN Dom Torrez MD       • enoxaparin (LOVENOX) syringe 30 mg  30 mg Subcutaneous Daily Dom Torrez MD   30 mg at 11/10/17 0823   • famotidine (PEPCID) tablet 40 mg  40 mg Oral Daily Dom Torrez MD   40 mg at 11/10/17 0823   • fenofibrate (TRICOR) tablet 145 mg  145 mg Oral Daily Dom Torrez MD   145 mg at 11/10/17 0823   • glipiZIDE (GLUCOTROL) tablet 5 mg  5 mg Oral QAM AC Dom Torrez MD   5 mg at 11/10/17 0633   • glucagon (human recombinant) (GLUCAGEN DIAGNOSTIC)  injection 1 mg  1 mg Subcutaneous Q15 Min PRN Dom Torrez MD       • hydrALAZINE (APRESOLINE) injection 10 mg  10 mg Intravenous Q6H PRN Dom Torrez MD   10 mg at 11/10/17 0426   • insulin aspart (novoLOG) injection 0-9 Units  0-9 Units Subcutaneous 4x Daily AC & at Bedtime Dom Torrez MD   Stopped at 11/08/17 0730   • labetalol (NORMODYNE) tablet 100 mg  100 mg Oral Q12H Williams Damon MD       • midodrine (PROAMATINE) tablet 2.5 mg  2.5 mg Oral BID AC Williams Damon MD       • ondansetron (ZOFRAN) injection 4 mg  4 mg Intravenous Q6H PRN Dom Torrez MD       • sodium chloride 0.9 % flush 1-10 mL  1-10 mL Intravenous PRN Dom Torrez MD               Results Review:  I have reviewed the labs, radiology results, and diagnostic studies.    Laboratory Data:     Results from last 7 days  Lab Units 11/10/17  0339 11/09/17  0547 11/08/17  0302   SODIUM mmol/L 139 144 143   POTASSIUM mmol/L 4.3 4.4 4.3   CHLORIDE mmol/L 110 114* 109   CO2 mmol/L 22.0 21.0* 22.0   BUN mg/dL 27* 30* 38*   CREATININE mg/dL 1.17* 1.23* 1.69*   GLUCOSE mg/dL 106* 98 84   CALCIUM mg/dL 8.2* 8.2* 8.7   BILIRUBIN mg/dL  --   --  0.3   ALK PHOS U/L  --   --  98   ALT (SGPT) U/L  --   --  40   AST (SGOT) U/L  --   --  34   ANION GAP mmol/L 7.0 9.0 12.0     Estimated Creatinine Clearance: 60.2 mL/min (by C-G formula based on Cr of 1.17).    Results from last 7 days  Lab Units 11/08/17  0302   MAGNESIUM mg/dL 2.0           Results from last 7 days  Lab Units 11/10/17  0339 11/09/17  0547 11/08/17  0302   WBC 10*3/mm3 6.00 5.17 7.57   HEMOGLOBIN g/dL 10.5* 10.3* 11.1*   HEMATOCRIT % 31.4* 31.0* 32.3*   PLATELETS 10*3/mm3 178 183 184       Results from last 7 days  Lab Units 11/08/17  0302   INR  1.07           Culture Data:   No results found for: BLOODCX  No results found for: URINECX  No results found for: RESPCX  No results found for: WOUNDCX  No results found for: STOOLCX  No components found for: BODYFLD        Radiology Data:   Imaging Results (last 24 hours)     ** No results found for the last 24 hours. **          I have reviewed the patient current medications.     Assessment/Plan     Active Hospital Problems (** Indicates Principal Problem)    Diagnosis Date Noted   • Syncope [R55] 11/08/2017      Resolved Hospital Problems    Diagnosis Date Noted Date Resolved   No resolved problems to display.     #1.  Syncope likely secondary to the bradycardia: Patient heart rate has been in 60s and 70s all times she is in the hospital.  We'll continue to monitor.  Her echocardiogram was normal, her carotid ultrasound showed right sided 50-69% occlusion.  We'll obtain orthostatic vitals.  #2.  Acute kidney injury: Creatinine has improved to 1.23.  Ace inhibitors are on hold.  We'll add hydralazine for the blood pressure management.  #3.  Hyperkalemia: Resolved.  #4.  Essential hypertension: We'll increase amlodipine to 10 mg, also add hydralazine 25 mg 3 times a day.  #5.  Diabetes mellitus: A1c is 6.5, continue with the sliding scale.  #6. Orthostatic hypotension: will start pt on labetalol and midodrine.       Discharge Planning: I expect patient to be discharged to home in 1-2 days.      DVT Prophylaxis: SCD/TEDs               This document has been electronically signed by Williams Damon MD on November 10, 2017 4:24 PM

## 2017-11-10 NOTE — PLAN OF CARE
Problem: Patient Care Overview (Adult)  Goal: Plan of Care Review  Outcome: Ongoing (interventions implemented as appropriate)    11/10/17 0427   Coping/Psychosocial Response Interventions   Plan Of Care Reviewed With patient   Patient Care Overview   Progress improving   Outcome Evaluation   Outcome Summary/Follow up Plan HR-SR, Pt denies SOA       Goal: Discharge Needs Assessment    11/08/17 0033 11/08/17 0125 11/08/17 0350   Discharge Needs Assessment   Concerns To Be Addressed --  --  --    Readmission Within The Last 30 Days --  --  no previous admission in last 30 days   Discharge Disposition --  --  still a patient   Living Environment   Transportation Available --  family or friend will provide --    Self-Care   Equipment Currently Used at Home none --  --      11/09/17 1840   Discharge Needs Assessment   Concerns To Be Addressed no discharge needs identified   Readmission Within The Last 30 Days --    Discharge Disposition --    Living Environment   Transportation Available --    Self-Care   Equipment Currently Used at Home --          Problem: Arrhythmia/Dysrhythmia (Symptomatic) (Adult)  Intervention: Prevent/Manage Thrombi/Emboli    11/10/17 0400 11/10/17 0427   Safety Interventions   Bleeding Precautions --  blood pressure closely monitored   Activity   Activity Type up ad tangela --        Intervention: Promote Hemodynamic Stability    11/10/17 0400 11/10/17 0427   Positioning   Head Of Bed (HOB) Position HOB elevated --    Cognitive Interventions   Sensory Stimulation Regulation --  quiet environment promoted;music/television provided for stimulation         Goal: Signs and Symptoms of Listed Potential Problems Will be Absent or Manageable (Arrhythmia/Dysrhythmia)  Outcome: Ongoing (interventions implemented as appropriate)    11/10/17 0427   Arrhythmia/Dysrhythmia (Symptomatic)   Problems Assessed (Arrhythmia/Dysrhythmia) all   Problems Present (Arrhythmia/Dysrhythmia) none         Problem: Diabetes,  Type 2 (Adult)  Intervention: Support/Optimize Psychosocial Response to Condition    11/10/17 0427   Coping/Psychosocial Interventions   Environmental Support calm environment promoted   Coping Strategies   Supportive Measures active listening utilized       Intervention: Optimize Glycemic Control    11/10/17 0427   Nutrition Interventions   Glycemic Management blood glucose monitoring;supplemental insulin given         Goal: Signs and Symptoms of Listed Potential Problems Will be Absent or Manageable (Diabetes, Type 2)  Outcome: Ongoing (interventions implemented as appropriate)    11/10/17 0427   Diabetes, Type 2   Problems Assessed (Type 2 Diabetes) all   Problems Present (Type 2 Diabetes) impaired glycemic control

## 2017-11-10 NOTE — THERAPY EVALUATION
Acute Care - Occupational Therapy Initial Evaluation  [facility]     Patient Name: May Chavez  : 1960  MRN: [id number]  Today's Date: 11/10/2017  Onset of Illness/Injury or Date of Surgery Date: 17  Date of Referral to OT: 17  Referring Physician: Dr. HUMA Damon    Admit Date: 2017       ICD-10-CM ICD-9-CM   1. Decreased activities of daily living (ADL) Z78.9 V49.89     Patient Active Problem List   Diagnosis   • Syncope     Past Medical History:   Diagnosis Date   • CHF (congestive heart failure)    • Diabetes mellitus    • Hypertension      Past Surgical History:   Procedure Laterality Date   • NEPHRECTOMY Right           OT ASSESSMENT FLOWSHEET (last 72 hours)      OT Evaluation       11/10/17 0745 17 1514 17 1116 17 1100 17 0125    Rehab Evaluation    Document Type evaluation   See MAR  -NN   --  -NN     Subjective Information agree to therapy;complains of;fatigue  -NN   --  -NN     Evaluation Not Performed  other (see comments)   Attempted PT eval - however, BP still 205/86 - therefore, evaluation not completed.  Will check on pt tomorrow.  -LM --  -NN other (see comments)  -NN     Evaluation Not Performed, Comment    Pt. BP is elevated to 214/94 several attempts will hold until approptiate  -NN     Symptoms Noted During/After Treatment --   Increased BP  -NN        Symptoms Noted Comment Pt. BP has been more stable per RN and okay to evaluate. During eval pt. BP became extremely elevated so t/f and fucntional mobility was not completed.   -NN        General Information    Patient Profile Review yes  -NN   --  -NN     Onset of Illness/Injury or Date of Surgery Date 17  -NN   --  -NN     Referring Physician Dr. HUMA Damon  -NN        General Observations sitting EOB, eating breakfast, IV site  -NN   --  -NN     Pertinent History Of Current Problem bradycardia, episode of syncope  -NN   --  -NN     Precautions/Limitations fall precautions   monitor BP   -NN        Prior Level of Function independent:;community mobility;all household mobility;ADL's;home management;cooking;cleaning;dependent:;driving  -NN        Equipment Currently Used at Home grab bar  -NN        Plans/Goals Discussed With patient;agreed upon  -NN        Risks Reviewed patient:;LOB;nausea/vomiting;dizziness;increased discomfort;change in vital signs;increased drainage;lines disloged  -NN        Benefits Reviewed patient:;improve function;increase independence;increase strength;increase balance;decrease pain;decrease risk of DVT;increase knowledge;improve skin integrity  -NN        Barriers to Rehab none identified  -NN        Living Environment    Lives With alone  -NN    parent(s)  -BG    Living Arrangements apartment  -NN    house  -BG    Home Accessibility bed and bath on same level;ramps present at home;grab bars present (bathtub);tub/shower is not walk in  -NN    stairs to enter home  -BG    Number of Stairs to Enter Home     4  -BG    Stair Railings at Home outside, present at both sides  -NN    inside, present on left side  -BG    Type of Financial/Environmental Concern     none  -BG    Transportation Available     family or friend will provide  -BG    Living Environment Comment Has 1 step then platform then step however pt. primarily uses back  -NN        Clinical Impression    Date of Referral to OT 11/09/17  -NN        OT Diagnosis decreased adl  -NN        Prognosis good  -NN        Impairments Found (describe specific impairments) aerobic capacity/endurance  -NN        Patient/Family Goals Statement go home and see grandbabies  -NN        Criteria for Skilled Therapeutic Interventions Met yes;treatment indicated  -NN        Rehab Potential good, to achieve stated therapy goals  -NN        Therapy Frequency --   3-14X/WEEK  -NN        Predicted Duration of Therapy Intervention (days/wks) until dc  -NN        Anticipated Discharge Disposition home with assist  -NN        Functional Level  Prior    Ambulation 0-->independent  -NN    0-->independent  -BG    Transferring 0-->independent  -NN    0-->independent  -BG    Toileting 0-->independent  -NN    0-->independent  -BG    Bathing 0-->independent  -NN    2-->assistive person  -BG    Dressing 0-->independent  -NN    0-->independent  -BG    Eating 0-->independent  -NN    0-->independent  -BG    Communication     0-->understands/communicates without difficulty  -BG    Swallowing     0-->swallows foods/liquids without difficulty  -BG    Prior Functional Level Comment     Home health  -BG    Vital Signs    Pre Systolic BP Rehab 138  -NN        Pre Treatment Diastolic BP 74  -NN        Intra Systolic BP Rehab 204    183/78   -NN        Intra Treatment Diastolic BP 86  -NN        Post Systolic BP Rehab 206  -NN        Post Treatment Diastolic BP 96  -NN        Pretreatment Heart Rate (beats/min) 81  -NN        Intratreatment Heart Rate (beats/min) 84  -NN        Posttreatment Heart Rate (beats/min) 80  -NN        Pre SpO2 (%) 98  -NN        O2 Delivery Pre Treatment room air  -NN        Intra SpO2 (%) 98  -NN        O2 Delivery Intra Treatment room air  -NN        Post SpO2 (%) 99  -NN        O2 Delivery Post Treatment room air  -NN        Pre Patient Position Sitting  -NN        Intra Patient Position Sitting  -NN        Post Patient Position Supine  -NN        Pain Assessment    Pain Assessment No/denies pain  -NN        Vision Assessment/Intervention    Visual Impairment WFL with corrective lenses  -NN        Cognitive Assessment/Intervention    Current Cognitive/Communication Assessment functional  -NN        Orientation Status oriented x 4  -NN        Follows Commands/Answers Questions 100% of the time;able to follow multi-step instructions  -NN        Personal Safety WNL/WFL  -NN        Personal Safety Interventions fall prevention program maintained;gait belt;muscle strengthening facilitated;nonskid shoes/slippers when out of bed;supervised activity   -NN        ROM (Range of Motion)    General ROM no range of motion deficits identified  -NN        MMT (Manual Muscle Testing)    General MMT Assessment no strength deficits identified  -NN        Bed Mobility, Assessment/Treatment    Bed Mobility, Assistive Device head of bed elevated  -NN        Bed Mob, Supine to Sit, [place] supervision required  -NN        Bed Mob, Sit to Supine, [place] supervision required  -NN        Bed Mobility, Comment Pt. sat EOB and complete ROM/MMT and BP was rechecked and extremely elevated. Pt. was placed in fowlers position and RN was notifed.  -NN        Transfer Assessment/Treatment    Transfer, Comment Deferred pt. BP too elevated at this time  -NN        Lower Body Dressing Assessment/Training    LB Dressing Assess/Train, Clothing Type doffing:;donning:;socks  -NN        LB Dressing Assess/Train, Position edge of bed  -NN        LB Dressing Assess/Train, [place] independent  -NN        Motor Skills/Interventions    Additional Documentation Balance Skills Training (Group)  -NN        Balance Skills Training    Sitting-Level of Assistance Independent  -NN        Sitting-Balance Support Feet supported;Right upper extremity supported;Left upper extremity supported  -NN        Sensory Assessment/Intervention    Sensory Impairment --   WFL per pt.  -NN        General Therapy Interventions    Planned Therapy Interventions activity intolerance;ADL retraining;balance training;bed mobility training;energy conservation;home exercise program;strengthening;transfer training  -NN        Positioning and Restraints    Pre-Treatment Position in bed  -NN        Post Treatment Position bed  -NN        In Bed fowlers;call light within reach;encouraged to call for assist;with family/caregiver;notified nsg;side rails up x2  -NN          11/08/17 0033                General Information    Equipment Currently Used at Home none  -WS        Functional Level Prior    Ambulation  0-->independent  -WS        Transferring 0-->independent  -WS        Toileting 0-->independent  -WS        Bathing 0-->independent  -WS        Dressing 0-->independent  -WS        Eating 0-->independent  -WS        Communication 0-->understands/communicates without difficulty  -WS        Swallowing 0-->swallows foods/liquids without difficulty  -WS          User Key  (r) = Recorded By, (t) = Taken By, (c) = Cosigned By    Initials Name Effective Dates    LM Giuliana Cindi, PT 06/15/16 -     WS Alessandro Del Rosario Jr., RN 10/17/16 -     BG Carlos Velazquez, RN 10/17/16 -     NN Marcie Frederick, OTR/L 11/08/17 -            Occupational Therapy Education     Title: PT OT SLP Therapies (Active)     Topic: Occupational Therapy (Active)     Point: ADL training (Active)    Description: Instruct learner(s) on proper safety adaptation and remediation techniques during self care or transfers.   Instruct in proper use of assistive devices.    Learning Progress Summary    Learner Readiness Method Response Comment Documented by Status   Patient Acceptance E NR Pt. educated on role of OT, safe t/f, benefit of activity, progression with poc NN 11/10/17 0814 Active               Point: Home exercise program (Active)    Description: Instruct learner(s) on appropriate technique for monitoring, assisting and/or progressing therapeutic exercises/activities.    Learning Progress Summary    Learner Readiness Method Response Comment Documented by Status   Patient Acceptance E NR Pt. educated on role of OT, safe t/f, benefit of activity, progression with poc NN 11/10/17 0814 Active               Point: Body mechanics (Active)    Description: Instruct learner(s) on proper positioning and spine alignment during self-care, functional mobility activities and/or exercises.    Learning Progress Summary    Learner Readiness Method Response Comment Documented by Status   Patient Acceptance E NR Pt. educated on role of OT, safe t/f, benefit of activity,  progression with poc  11/10/17 0814 Active                      User Key     Initials Effective Dates Name Provider Type Discipline     11/08/17 -  Marcie Frederick, OTR/L Occupational Therapist OT                  OT Recommendation and Plan  Anticipated Discharge Disposition: home with assist  Planned Therapy Interventions: activity intolerance, ADL retraining, balance training, bed mobility training, energy conservation, home exercise program, strengthening, transfer training  Therapy Frequency:  (3-14X/WEEK)  Plan of Care Review  Plan Of Care Reviewed With: patient  Progress: progress toward functional goals as expected  Outcome Summary/Follow up Plan: OT eval completed. OT checked with RN and was cleared to see pt. d/t pt. BP had been stable however during eval pt. BP became extremely elevated so no t/f or functional mobility was completed . Pt. however did demonstrate ability to hugo/doff socks EOB independently and complete ROM/MMT EOB. At this time further treatment is deferred until BP becomes more stable. Pt. does cont to benefit from skilled OT d/t decreased activity tolerance,decreased  independence in ADLs. Please monitor pt. v/s closely. 11/10/17 0815          OT Goals       11/10/17 0814          Transfer Training OT LTG    Transfer Training OT LTG, Date Established 11/10/17  -      Transfer Training OT LTG, Time to Achieve by discharge  -      Transfer Training OT LTG, Activity Type all transfers  -      Transfer Training OT LTG, [place] Level independent  -NN      Patient Education OT LTG    Patient Education OT LTG, Date Established 11/10/17  -      Patient Education OT LTG, Time to Achieve by discharge  -      Patient Education OT LTG, Education Type HEP;posture/body mechanics;home safety;energy conservation;work simplification  -      Patient Education OT LTG, Education Understanding independent;demonstrates adequately;verbalizes understanding  -NN      ADL OT LTG    ADL OT  LTG, Date Established 11/10/17  -NN      ADL OT LTG, Time to Achieve by discharge  -NN      ADL OT LTG, Activity Type ADL skills  -NN      ADL OT LTG, [place] Level independent  -NN        User Key  (r) = Recorded By, (t) = Taken By, (c) = Cosigned By    Initials Name Provider Type    MIKE Frederick OTR/L Occupational Therapist                Outcome Measures       11/10/17 0800          How much help from another is currently needed...    Putting on and taking off regular lower body clothing? 3  -NN      Bathing (including washing, rinsing, and drying) 3  -NN      Toileting (which includes using toilet bed pan or urinal) 3  -NN      Putting on and taking off regular upper body clothing 4  -NN      Taking care of personal grooming (such as brushing teeth) 4  -NN      Eating meals 4  -NN      Score 21  -NN      Functional Assessment    Outcome Measure Options AM-PAC 6 Clicks Daily Activity (OT)  -NN        User Key  (r) = Recorded By, (t) = Taken By, (c) = Cosigned By    Initials Name Provider Type    MIKE Frederick OTR/L Occupational Therapist          Time Calculation:   OT Start Time: 0745  OT Stop Time: 0815  OT Time Calculation (min): 30 min    Therapy Charges for Today     Code Description Service Date Service Provider Modifiers Qty    [id number]  OT SELFCARE CURRENT 11/10/2017 Marcie Frederick OTR/L GO, CJ 1    [id number]  OT SELFCARE PROJECTED 11/10/2017 Marcie Frederick OTR/L GO, CI 1    [id number]  OT EVAL MOD COMPLEXITY 2 11/10/2017 JOEL Ulloa/L GO, KX 1          OT G-codes  OT Functional Scales Options: AM-PAC 6 Clicks Daily Activity (OT)  Functional Limitation: Self care  Self Care Current Status (): At least 20 percent but less than 40 percent impaired, limited or restricted  Self Care Goal Status (): At least 1 percent but less than 20 percent impaired, limited or restricted    JOEL Ulloa/DIANE  11/10/2017

## 2017-11-10 NOTE — THERAPY EVALUATION
Acute Care - Physical Therapy Initial Evaluation  [facility]     Patient Name: May Chavez  : 1960  MRN: [id number]  Today's Date: 11/10/2017   Onset of Illness/Injury or Date of Surgery Date: 17  Date of Referral to PT: 17  Referring Physician: Dr. Williams Damon      Admit Date: 2017     Visit Dx:    ICD-10-CM ICD-9-CM   1. Decreased activities of daily living (ADL) Z78.9 V49.89   2. Impaired physical mobility Z74.09 781.99     Patient Active Problem List   Diagnosis   • Syncope     Past Medical History:   Diagnosis Date   • CHF (congestive heart failure)    • Diabetes mellitus    • Hypertension      Past Surgical History:   Procedure Laterality Date   • NEPHRECTOMY Right           PT ASSESSMENT (last 72 hours)      PT Evaluation       11/10/17 1543 11/10/17 1455    Rehab Evaluation    Document Type  evaluation  -TERRI    Subjective Information  agree to therapy  -TERRI    Patient Effort, Rehab Treatment  adequate  -TERRI    Symptoms Noted During/After Treatment  significant change in vital signs  -TERRI    Symptoms Noted Comment  BP still elevated;PT completed bed eval only  -TERRI    General Information    Patient Profile Review  yes  -TERRI    Onset of Illness/Injury or Date of Surgery Date  17  -TERRI    Referring Physician  Dr. Williams Damon  -TERRI    General Observations  Pt in fowlers;noted IV, telemetry  -TERRI    Pertinent History Of Current Problem  Pt admitted to [facility] after syncopal episode with bradycardia, VELVET with HTN  -TERRI    Precautions/Limitations  fall precautions   vital signs, especially BP  -TERRI    Prior Level of Function  independent:;all household mobility;community mobility;ADL's;home management;cooking;cleaning;dependent:;driving  -TERRI    Equipment Currently Used at Home glucometer;grab bar;other (see comments)   has blood pressure monitior and scales to weigh  -KP grab bar  -TERRI    Plans/Goals Discussed With  patient;agreed upon  -TERRI    Risks Reviewed  patient:;dizziness;change  in vital signs  -    Benefits Reviewed  patient:;improve function;increase independence  -    Barriers to Rehab  none identified  -    Living Environment    Lives With --   staying with her mother at this time  - alone  -    Living Arrangements apartment  - apartment  -    Home Accessibility bed and bath on same level;ramps present at home;grab bars present (bathtub);tub/shower is not walk in  [facility] bed and bath on same level;stairs to enter home;ramps present at home  -    Number of Stairs to Enter Home 4  -KP 4  -TERRI    Stair Railings at Home outside, present at both sides  - outside, present at both sides  -    Type of Financial/Environmental Concern none  -     Transportation Available family or friend will provide  - family or friend will provide  -    Living Environment Comment  ramp in back  -    Clinical Impression    Date of Referral to PT  11/09/17  -    PT Diagnosis  impaired physical mobility due to HTN, s/p syncopal episode  -    Prognosis  per MD  -TERRI    Patient/Family Goals Statement  Return to [facility]  -    Criteria for Skilled Therapeutic Interventions Met  yes  -    Rehab Potential  good, to achieve stated therapy goals  -    Predicted Duration of Therapy Intervention (days/wks)  until discharge or goals met  -    Vital Signs    Pre Systolic BP Rehab  207  -TERRI    Pre Treatment Diastolic BP  87  -TERRI    Post Systolic BP Rehab  202  -TERRI    Post Treatment Diastolic BP  87  -TERRI    Pretreatment Heart Rate (beats/min)  75  -TERRI    Posttreatment Heart Rate (beats/min)  80  -TERRI    Pre SpO2 (%)  98  -TERRI    O2 Delivery Pre Treatment  room air  -    Post SpO2 (%)  98  -TERRI    O2 Delivery Post Treatment  room air  -    Pre Patient Position  Supine  -    Post Patient Position  Supine  -    Pain Assessment    Pain Assessment  No/denies pain  -    Vision Assessment/Intervention    Visual Impairment  WFL with corrective lenses  -    Cognitive Assessment/Intervention     Current Cognitive/Communication Assessment  functional  -    Orientation Status  oriented x 4  -TERRI    Follows Commands/Answers Questions  100% of the time  -TERRI    Personal Safety  WNL/WFL  -TERRI    Personal Safety Interventions  fall prevention program maintained  -TERRI    ROM (Range of Motion)    General ROM  no range of motion deficits identified  -TERRI    MMT (Manual Muscle Testing)    General MMT Assessment  lower extremity strength deficits identified  -    General MMT Assessment Detail  Please refer to OT evaluation for BUE strength detail  -TERRI    Lower Extremity    Lower Ext Manual Muscle Testing Detail  BLE: 4-/5 ankles/feet, knees, hips  -TERRI    Bed Mobility, Assessment/Treatment    Bed Mobility, Comment  deferred EOB/OOB assessment due to elevated BP. Noted pt's trunk in midline in fowlers.Pt denies lightheadedness and dizziness.  -TERRI    Transfer Assessment/Treatment    Transfer, Comment  deferred  -TERRI    Gait Assessment/Treatment    Gait, Comment  deferred  -TERRI    Sensory Assessment/Intervention    Light Touch  LLE;RLE  -TERRI    LLE Light Touch  WNL  -TERRI    RLE Light Touch  WNL  -TERRI    Edema Management    Edema Amount  right:;left:;minimal  -TERRI    Positioning and Restraints    Pre-Treatment Position  in bed  -TERRI    Post Treatment Position  bed  -TERRI    In Bed  fowlers;call light within reach;encouraged to call for assist  -TERRI      11/10/17 0745 11/09/17 1514    Rehab Evaluation    Document Type evaluation   See MAR  -NN     Subjective Information agree to therapy;complains of;fatigue  -NN     Evaluation Not Performed  other (see comments)   Attempted PT eval - however, BP still 205/86 - therefore, evaluation not completed.  Will check on pt tomorrow.  -LM    Symptoms Noted During/After Treatment --   Increased BP  -NN     Symptoms Noted Comment Pt. BP has been more stable per RN and okay to evaluate. During eval pt. BP became extremely elevated so t/f and fucntional mobility was not completed.   -NN     General  Information    Patient Profile Review yes  -NN     Onset of Illness/Injury or Date of Surgery Date 11/08/17  -NN     Referring Physician Dr. HUMA Damon  -NN     General Observations sitting EOB, eating breakfast, IV site  -NN     Pertinent History Of Current Problem bradycardia, episode of syncope  -NN     Precautions/Limitations fall precautions   monitor BP  -NN     Prior Level of Function independent:;community mobility;all household mobility;ADL's;home management;cooking;cleaning;dependent:;driving  -NN     Equipment Currently Used at Home grab bar  -NN     Plans/Goals Discussed With patient;agreed upon  -NN     Risks Reviewed patient:;LOB;nausea/vomiting;dizziness;increased discomfort;change in vital signs;increased drainage;lines disloged  -NN     Benefits Reviewed patient:;improve function;increase independence;increase strength;increase balance;decrease pain;decrease risk of DVT;increase knowledge;improve skin integrity  -NN     Barriers to Rehab none identified  -NN     Living Environment    Lives With alone  -NN     Living Arrangements apartment  -NN     Home Accessibility bed and bath on same level;ramps present at home;grab bars present (bathtub);tub/shower is not walk in  -NN     Stair Railings at Home outside, present at both sides  -NN     Living Environment Comment Has 1 step then platform then step however pt. primarily uses back  -NN     Vital Signs    Pre Systolic BP Rehab 138  -NN     Pre Treatment Diastolic BP 74  -NN     Intra Systolic BP Rehab 204    183/78   -NN     Intra Treatment Diastolic BP 86  -NN     Post Systolic BP Rehab 206  -NN     Post Treatment Diastolic BP 96  -NN     Pretreatment Heart Rate (beats/min) 81  -NN     Intratreatment Heart Rate (beats/min) 84  -NN     Posttreatment Heart Rate (beats/min) 80  -NN     Pre SpO2 (%) 98  -NN     O2 Delivery Pre Treatment room air  -NN     Intra SpO2 (%) 98  -NN     O2 Delivery Intra Treatment room air  -NN     Post SpO2 (%) 99  -NN     O2  Delivery Post Treatment room air  -NN     Pre Patient Position Sitting  -NN     Intra Patient Position Sitting  -NN     Post Patient Position Supine  -NN     Pain Assessment    Pain Assessment No/denies pain  -NN     Vision Assessment/Intervention    Visual Impairment WFL with corrective lenses  -NN     Cognitive Assessment/Intervention    Current Cognitive/Communication Assessment functional  -NN     Orientation Status oriented x 4  -NN     Follows Commands/Answers Questions 100% of the time;able to follow multi-step instructions  -NN     Personal Safety WNL/WFL  -NN     Personal Safety Interventions fall prevention program maintained;gait belt;muscle strengthening facilitated;nonskid shoes/slippers when out of bed;supervised activity  -NN     ROM (Range of Motion)    General ROM no range of motion deficits identified  -NN     MMT (Manual Muscle Testing)    General MMT Assessment no strength deficits identified  -NN     Bed Mobility, Assessment/Treatment    Bed Mobility, Assistive Device head of bed elevated  -NN     Bed Mob, Supine to Sit, [place] supervision required  -NN     Bed Mob, Sit to Supine, [place] supervision required  -NN     Bed Mobility, Comment Pt. sat EOB and complete ROM/MMT and BP was rechecked and extremely elevated. Pt. was placed in fowlers position and RN was notifed.  -NN     Transfer Assessment/Treatment    Transfer, Comment Deferred pt. BP too elevated at this time  -NN     Motor Skills/Interventions    Additional Documentation Balance Skills Training (Group)  -NN     Balance Skills Training    Sitting-Level of Assistance Independent  -NN     Sitting-Balance Support Feet supported;Right upper extremity supported;Left upper extremity supported  -NN     Sensory Assessment/Intervention    Sensory Impairment --   WFL per pt.  -NN     Positioning and Restraints    Pre-Treatment Position in bed  -NN     Post Treatment Position bed  -NN     In Bed fowlers;call light within  reach;encouraged to call for assist;with family/caregiver;notified nsg;side rails up x2  -NN       11/09/17 1116 11/09/17 1100    Rehab Evaluation    Document Type  --  -NN    Subjective Information  --  -NN    Evaluation Not Performed --  -NN other (see comments)  -NN    Evaluation Not Performed, Comment  Pt. BP is elevated to 214/94 several attempts will hold until approptiate  -NN    General Information    Patient Profile Review  --  -NN    Onset of Illness/Injury or Date of Surgery Date  --  -NN    General Observations  --  -NN    Pertinent History Of Current Problem  --  -NN      11/08/17 0125 11/08/17 0033    General Information    Equipment Currently Used at Home  none  -WS    Living Environment    Lives With parent(s)  -BG     Living Arrangements house  -BG     Home Accessibility stairs to enter home  -BG     Number of Stairs to Enter Home 4  -BG     Stair Railings at Home inside, present on left side  -BG     Type of Financial/Environmental Concern none  -BG     Transportation Available family or friend will provide  -BG       User Key  (r) = Recorded By, (t) = Taken By, (c) = Cosigned By    Initials Name Provider Type    BILL Puente, PT Physical Therapist    TERRI Joseph, PT Physical Therapist    WS Alessandro Del Rosario Jr., RN Registered Nurse    BG Carlos Velazquez, RN Registered Nurse    SHAZIA Fowler     MIKE Frederick, OTR/L Occupational Therapist          Physical Therapy Education     Title: PT OT SLP Therapies (Active)     Topic: Physical Therapy (Active)     Point: Precautions (Active)    Learning Progress Summary    Learner Readiness Method Response Comment Documented by Status   Patient Acceptance E NR   11/10/17 1605 Active                      User Key     Initials Effective Dates Name Provider Type Discipline     10/17/16 -  Radha Joseph, PT Physical Therapist PT                PT Recommendation and Plan  Anticipated Discharge Disposition: home with assist, home with  home health  Planned Therapy Interventions: gait training, bed mobility training, balance training, patient/family education, stair training, strengthening, transfer training  PT Frequency: other (see comments) (5-14 times per week)  Plan of Care Review  Plan Of Care Reviewed With: patient  Outcome Summary/Follow up Plan: PT evaluation completed. Completed bed evaluation only as patient's BP still elevated and did not improve following strength and range assessment.  function limited primarily by decreased strength and tolerance for functional mobility and activities. Pt will benefit from skilled PT to gently regain lost function as pt improves medically.If pt discharged home prior to goals being met, PT recommends continued therapy services.           IP PT Goals       11/10/17 1606          Transfer Training PT LTG    Transfer Training PT LTG, Date Established 11/10/17  -      Transfer Training PT LTG, Time to Achieve by discharge  -      Transfer Training PT LTG, Activity Type bed to chair /chair to bed;sit to stand/stand to sit  -      Transfer Training PT LTG, [place] Level independent  -      Transfer Training PT LTG, Outcome goal ongoing  -      Gait Training PT LTG    Gait Training Goal PT LTG, Date Established 11/10/17  -      Gait Training Goal PT LTG, Time to Achieve by discharge  -      Gait Training Goal PT LTG, [place] Level independent;conditional independence  -      Gait Training Goal PT LTG, Distance to Achieve [id number] with VSS  -      Gait Training Goal PT LTG, Outcome goal ongoing  -      Stair Training PT LTG    Stair Training Goal PT LTG, Date Established 11/10/17  -      Stair Training Goal PT LTG, Time to Achieve by discharge  -      Stair Training Goal PT LTG, Number of Steps 4  -      Stair Training Goal PT LTG, [place] Level conditional independence  -      Stair Training Goal PT LTG, Assist Device 2 handrails  -      Stair Training Goal PT LTG,  Outcome goal ongoing  -      Strength Goal PT LTG    Strength Goal PT LTG, Date Established 11/10/17  -      Strength Goal PT LTG, Time to Achieve by discharge  -      Strength Goal PT LTG, Measure to Achieve Pt will tolerate 15 reps of AROM exercises in sitting with VSS  -      Strength Goal PT LTG, Functional Goal Pt will progress to standing exercises  -      Strength Goal PT LTG, Outcome goal ongoing  -      Patient Education PT LTG    Patient Education PT LTG, Date Established 11/10/17  -      Patient Education PT LTG, Time to Achieve by discharge  -      Patient Education PT LTG, Education Type HEP;gait;transfers;home safety  -      Patient Education PT LTG Outcome goal ongoing  -        User Key  (r) = Recorded By, (t) = Taken By, (c) = Cosigned By    Initials Name Provider Type    TERRI Joseph, PT Physical Therapist                Outcome Measures       11/10/17 1455 11/10/17 0800       How much help from another person do you currently need...    Turning from your back to your side while in flat bed without using bedrails? 4  -TERRI      Moving from lying on back to sitting on the side of a flat bed without bedrails? 3  -TERRI      Moving to and from a bed to a chair (including a wheelchair)? 3  -TERRI      Standing up from a chair using your arms (e.g., wheelchair, bedside chair)? 3  -TERRI      Climbing 3-5 steps with a railing? 3  -TERRI      To walk in hospital room? 3  -TERRI      AM-PAC 6 Clicks Score 19  -TERRI      How much help from another is currently needed...    Putting on and taking off regular lower body clothing?  3  -NN     Bathing (including washing, rinsing, and drying)  3  -NN     Toileting (which includes using toilet bed pan or urinal)  3  -NN     Putting on and taking off regular upper body clothing  4  -NN     Taking care of personal grooming (such as brushing teeth)  4  -NN     Eating meals  4  -NN     Score  21  -NN     Functional Assessment    Outcome Measure Options AM-PAC 6  Clicks Basic Mobility (PT)  -TERRI AM-PAC 6 Clicks Daily Activity (OT)  -NN       User Key  (r) = Recorded By, (t) = Taken By, (c) = Cosigned By    Initials Name Provider Type    TERRI Joseph, PT Physical Therapist    NN Marcie Frederick, OTR/L Occupational Therapist           Time Calculation:         PT Charges       11/10/17 1613          Time Calculation    Start Time 1455  -TERRI      Stop Time 1516  -      Time Calculation (min) 21 min  -TERRI      PT Received On 11/10/17  -      PT Goal Re-Cert Due Date 11/23/17  -      Time Calculation- PT    Total Timed Code Minutes- PT 0 minute(s)  -        User Key  (r) = Recorded By, (t) = Taken By, (c) = Cosigned By    Initials Name Provider Type    TERRI Joseph PT Physical Therapist          Therapy Charges for Today     Code Description Service Date Service Provider Modifiers Qty    45497667416 HC PT MOBILITY CURRENT 11/10/2017 Radha Joseph, PT GP,  1    11761860485 HC PT MOBILITY PROJECTED 11/10/2017 Radha Joseph, PT GP,  1    38236837160 HC PT EVAL MOD COMPLEXITY 1 11/10/2017 Radha Joseph, PT GP, KX 1          PT G-Codes  PT Professional Judgement Used?: Yes  Outcome Measure Options: AM-PAC 6 Clicks Basic Mobility (PT)  Score: 19  Functional Limitation: Mobility: Walking and moving around  Mobility: Walking and Moving Around Current Status (): At least 20 percent but less than 40 percent impaired, limited or restricted  Mobility: Walking and Moving Around Goal Status (): 0 percent impaired, limited or restricted      Radha Joseph PT  11/10/2017

## 2017-11-10 NOTE — PLAN OF CARE
Problem: Patient Care Overview (Adult)  Goal: Plan of Care Review  Outcome: Ongoing (interventions implemented as appropriate)    11/10/17 0814   Coping/Psychosocial Response Interventions   Plan Of Care Reviewed With patient   Patient Care Overview   Progress progress toward functional goals as expected   Outcome Evaluation   Outcome Summary/Follow up Plan OT eval completed. OT checked with RN and was cleared to see pt. d/t pt. BP had been stable however during eval pt. BP became extremely elevated so no t/f or functional mobility was completed . Pt. however did demonstrate ability to hugo/doff socks EOB independently and complete ROM/MMT EOB. At this time further treatment is deferred until BP becomes more stable. Pt. does cont to benefit from skilled OT d/t decreased activity tolerance,decreased independence in ADLs. Please monitor pt. v/s closely. 11/10/17 0815         Problem: Inpatient Occupational Therapy  Goal: Transfer Training Goal 1 LTG- OT  Outcome: Ongoing (interventions implemented as appropriate)    11/10/17 0814   Transfer Training OT LTG   Transfer Training OT LTG, Date Established 11/10/17   Transfer Training OT LTG, Time to Achieve by discharge   Transfer Training OT LTG, Activity Type all transfers   Transfer Training OT LTG, [place] Level independent       Goal: Patient Education Goal LTG- OT  Outcome: Ongoing (interventions implemented as appropriate)    11/10/17 0814   Patient Education OT LTG   Patient Education OT LTG, Date Established 11/10/17   Patient Education OT LTG, Time to Achieve by discharge   Patient Education OT LTG, Education Type HEP;posture/body mechanics;home safety;energy conservation;work simplification   Patient Education OT LTG, Education Understanding independent;demonstrates adequately;verbalizes understanding       Goal: ADL Goal LTG- OT  Outcome: Ongoing (interventions implemented as appropriate)    11/10/17 0814   ADL OT LTG   ADL OT LTG, Date Established  11/10/17   ADL OT LTG, Time to Achieve by discharge   ADL OT LTG, Activity Type ADL skills   ADL OT LTG, [place] Level independent

## 2017-11-10 NOTE — PLAN OF CARE
Problem: Patient Care Overview (Adult)  Goal: Plan of Care Review  Outcome: Ongoing (interventions implemented as appropriate)    11/10/17 1606   Coping/Psychosocial Response Interventions   Plan Of Care Reviewed With patient   Outcome Evaluation   Outcome Summary/Follow up Plan PT evaluation completed. Completed bed evaluation only as patient's BP still elevated and did not improve following strength and range assessment. function limited primarily by decreased strength and tolerance for functional mobility and activities. Pt will benefit from skilled PT to gently regain lost function as pt improves medically.If pt discharged home prior to goals being met, PT recommends continued therapy services.          Problem: Inpatient Physical Therapy  Goal: Transfer Training Goal 1 LTG- PT  Outcome: Ongoing (interventions implemented as appropriate)    11/10/17 1606   Transfer Training PT LTG   Transfer Training PT LTG, Date Established 11/10/17   Transfer Training PT LTG, Time to Achieve by discharge   Transfer Training PT LTG, Activity Type bed to chair /chair to bed;sit to stand/stand to sit   Transfer Training PT LTG, [place] Level independent   Transfer Training PT LTG, Outcome goal ongoing       Goal: Gait Training Goal LTG- PT  Outcome: Ongoing (interventions implemented as appropriate)    11/10/17 1606   Gait Training PT LTG   Gait Training Goal PT LTG, Date Established 11/10/17   Gait Training Goal PT LTG, Time to Achieve by discharge   Gait Training Goal PT LTG, [place] Level independent;conditional independence   Gait Training Goal PT LTG, Distance to Achieve [id number] with VSS   Gait Training Goal PT LTG, Outcome goal ongoing       Goal: Stair Training Goal LTG- PT  Outcome: Ongoing (interventions implemented as appropriate)    11/10/17 1606   Stair Training PT LTG   Stair Training Goal PT LTG, Date Established 11/10/17   Stair Training Goal PT LTG, Time to Achieve by discharge   Stair Training Goal PT  LTG, Number of Steps 4   Stair Training Goal PT LTG, [place] Level conditional independence   Stair Training Goal PT LTG, Assist Device 2 handrails   Stair Training Goal PT LTG, Outcome goal ongoing       Goal: Strength Goal LTG- PT  Outcome: Ongoing (interventions implemented as appropriate)    11/10/17 1606   Strength Goal PT LTG   Strength Goal PT LTG, Date Established 11/10/17   Strength Goal PT LTG, Time to Achieve by discharge   Strength Goal PT LTG, Measure to Achieve Pt will tolerate 15 reps of AROM exercises in sitting with VSS   Strength Goal PT LTG, Functional Goal Pt will progress to standing exercises   Strength Goal PT LTG, Outcome goal ongoing       Goal: Patient Education Goal LTG- PT  Outcome: Ongoing (interventions implemented as appropriate)    11/10/17 1606   Patient Education PT LTG   Patient Education PT LTG, Date Established 11/10/17   Patient Education PT LTG, Time to Achieve by discharge   Patient Education PT LTG, Education Type HEP;gait;transfers;home safety   Patient Education PT LTG Outcome goal ongoing

## 2017-11-11 LAB
ANION GAP SERPL CALCULATED.3IONS-SCNC: 7 MMOL/L (ref 5–15)
BACTERIA UR QL AUTO: ABNORMAL /HPF
BASOPHILS # BLD AUTO: 0.02 10*3/MM3 (ref 0–0.2)
BASOPHILS NFR BLD AUTO: 0.3 % (ref 0–2)
BILIRUB UR QL STRIP: NEGATIVE
BUN BLD-MCNC: 31 MG/DL (ref 7–21)
BUN/CREAT SERPL: 19.4 (ref 7–25)
CALCIUM SPEC-SCNC: 8.1 MG/DL (ref 8.4–10.2)
CHLORIDE SERPL-SCNC: 108 MMOL/L (ref 95–110)
CLARITY UR: CLEAR
CO2 SERPL-SCNC: 23 MMOL/L (ref 22–31)
COLOR UR: YELLOW
CREAT BLD-MCNC: 1.6 MG/DL (ref 0.5–1)
CREAT UR-MCNC: 115.6 MG/DL
DEPRECATED RDW RBC AUTO: 43 FL (ref 36.4–46.3)
EOSINOPHIL # BLD AUTO: 0.14 10*3/MM3 (ref 0–0.7)
EOSINOPHIL NFR BLD AUTO: 2.2 % (ref 0–7)
ERYTHROCYTE [DISTWIDTH] IN BLOOD BY AUTOMATED COUNT: 13.7 % (ref 11.5–14.5)
FERRITIN SERPL-MCNC: 183 NG/ML (ref 11.1–264)
FOLATE SERPL-MCNC: 13 NG/ML (ref 2.76–21)
GFR SERPL CREATININE-BSD FRML MDRD: 33 ML/MIN/1.73 (ref 51–120)
GLUCOSE BLD-MCNC: 115 MG/DL (ref 60–100)
GLUCOSE BLDC GLUCOMTR-MCNC: 128 MG/DL (ref 70–130)
GLUCOSE BLDC GLUCOMTR-MCNC: 161 MG/DL (ref 70–130)
GLUCOSE BLDC GLUCOMTR-MCNC: 74 MG/DL (ref 70–130)
GLUCOSE UR STRIP-MCNC: NEGATIVE MG/DL
HCT VFR BLD AUTO: 28.7 % (ref 35–45)
HGB BLD-MCNC: 9.6 G/DL (ref 12–15.5)
HGB UR QL STRIP.AUTO: NEGATIVE
HYALINE CASTS UR QL AUTO: ABNORMAL /LPF
IMM GRANULOCYTES # BLD: 0.01 10*3/MM3 (ref 0–0.02)
IMM GRANULOCYTES NFR BLD: 0.2 % (ref 0–0.5)
IRON 24H UR-MRATE: 42 MCG/DL (ref 37–170)
IRON SATN MFR SERPL: 15 % (ref 15–50)
KETONES UR QL STRIP: NEGATIVE
LEUKOCYTE ESTERASE UR QL STRIP.AUTO: NEGATIVE
LYMPHOCYTES # BLD AUTO: 1.57 10*3/MM3 (ref 0.6–4.2)
LYMPHOCYTES NFR BLD AUTO: 25.2 % (ref 10–50)
MCH RBC QN AUTO: 28.6 PG (ref 26.5–34)
MCHC RBC AUTO-ENTMCNC: 33.4 G/DL (ref 31.4–36)
MCV RBC AUTO: 85.4 FL (ref 80–98)
MONOCYTES # BLD AUTO: 0.68 10*3/MM3 (ref 0–0.9)
MONOCYTES NFR BLD AUTO: 10.9 % (ref 0–12)
NEUTROPHILS # BLD AUTO: 3.82 10*3/MM3 (ref 2–8.6)
NEUTROPHILS NFR BLD AUTO: 61.2 % (ref 37–80)
NITRITE UR QL STRIP: NEGATIVE
OSMOLALITY UR: 422 MOSM/KG (ref 38–1400)
PH UR STRIP.AUTO: <=5 [PH] (ref 5–9)
PLATELET # BLD AUTO: 176 10*3/MM3 (ref 150–450)
PMV BLD AUTO: 10.4 FL (ref 8–12)
POTASSIUM BLD-SCNC: 4.3 MMOL/L (ref 3.5–5.1)
PROT UR QL STRIP: ABNORMAL
RBC # BLD AUTO: 3.36 10*6/MM3 (ref 3.77–5.16)
RBC # UR: ABNORMAL /HPF
REF LAB TEST METHOD: ABNORMAL
SODIUM BLD-SCNC: 138 MMOL/L (ref 137–145)
SODIUM UR-SCNC: 32 MMOL/L (ref 30–90)
SP GR UR STRIP: 1.02 (ref 1–1.03)
SQUAMOUS #/AREA URNS HPF: ABNORMAL /HPF
TIBC SERPL-MCNC: 278 MCG/DL (ref 265–497)
UROBILINOGEN UR QL STRIP: ABNORMAL
VIT B12 BLD-MCNC: 348 PG/ML (ref 239–931)
WBC NRBC COR # BLD: 6.24 10*3/MM3 (ref 3.2–9.8)
WBC UR QL AUTO: ABNORMAL /HPF

## 2017-11-11 PROCEDURE — 25010000002 ENOXAPARIN PER 10 MG: Performed by: INTERNAL MEDICINE

## 2017-11-11 PROCEDURE — 97116 GAIT TRAINING THERAPY: CPT

## 2017-11-11 PROCEDURE — 63710000001 INSULIN ASPART PER 5 UNITS: Performed by: INTERNAL MEDICINE

## 2017-11-11 PROCEDURE — 83935 ASSAY OF URINE OSMOLALITY: CPT | Performed by: INTERNAL MEDICINE

## 2017-11-11 PROCEDURE — 85025 COMPLETE CBC W/AUTO DIFF WBC: CPT | Performed by: INTERNAL MEDICINE

## 2017-11-11 PROCEDURE — 80048 BASIC METABOLIC PNL TOTAL CA: CPT | Performed by: INTERNAL MEDICINE

## 2017-11-11 PROCEDURE — 97530 THERAPEUTIC ACTIVITIES: CPT

## 2017-11-11 PROCEDURE — 81001 URINALYSIS AUTO W/SCOPE: CPT | Performed by: INTERNAL MEDICINE

## 2017-11-11 PROCEDURE — 82570 ASSAY OF URINE CREATININE: CPT | Performed by: INTERNAL MEDICINE

## 2017-11-11 PROCEDURE — 82962 GLUCOSE BLOOD TEST: CPT

## 2017-11-11 PROCEDURE — 84300 ASSAY OF URINE SODIUM: CPT | Performed by: INTERNAL MEDICINE

## 2017-11-11 RX ORDER — SODIUM CHLORIDE 9 MG/ML
100 INJECTION, SOLUTION INTRAVENOUS CONTINUOUS
Status: DISPENSED | OUTPATIENT
Start: 2017-11-11 | End: 2017-11-12

## 2017-11-11 RX ADMIN — Medication 10 ML: at 21:21

## 2017-11-11 RX ADMIN — GLIPIZIDE 5 MG: 5 TABLET ORAL at 08:16

## 2017-11-11 RX ADMIN — AMLODIPINE BESYLATE 10 MG: 10 TABLET ORAL at 08:16

## 2017-11-11 RX ADMIN — FAMOTIDINE 40 MG: 40 TABLET ORAL at 08:19

## 2017-11-11 RX ADMIN — CITALOPRAM HYDROBROMIDE 40 MG: 40 TABLET ORAL at 08:16

## 2017-11-11 RX ADMIN — FENOFIBRATE 145 MG: 145 TABLET ORAL at 08:16

## 2017-11-11 RX ADMIN — SODIUM CHLORIDE 100 ML/HR: 900 INJECTION, SOLUTION INTRAVENOUS at 22:45

## 2017-11-11 RX ADMIN — MIDODRINE HYDROCHLORIDE 2.5 MG: 2.5 TABLET ORAL at 08:16

## 2017-11-11 RX ADMIN — ENOXAPARIN SODIUM 30 MG: 30 INJECTION SUBCUTANEOUS at 08:16

## 2017-11-11 RX ADMIN — LABETALOL HYDROCHLORIDE 100 MG: 100 TABLET, FILM COATED ORAL at 21:21

## 2017-11-11 RX ADMIN — SODIUM CHLORIDE 100 ML/HR: 900 INJECTION, SOLUTION INTRAVENOUS at 14:53

## 2017-11-11 RX ADMIN — INSULIN ASPART 2 UNITS: 100 INJECTION, SOLUTION INTRAVENOUS; SUBCUTANEOUS at 21:21

## 2017-11-11 RX ADMIN — LABETALOL HYDROCHLORIDE 100 MG: 100 TABLET, FILM COATED ORAL at 08:16

## 2017-11-11 RX ADMIN — ASPIRIN 81 MG 81 MG: 81 TABLET ORAL at 08:19

## 2017-11-11 NOTE — THERAPY TREATMENT NOTE
Acute Care - Physical Therapy Treatment Note  [facility]     Patient Name: May Chavez  : 1960  MRN: [id number]  Today's Date: 2017  Onset of Illness/Injury or Date of Surgery Date: 17  Date of Referral to PT: 17  Referring Physician: Dr. Williams Damon    Admit Date: 2017    Visit Dx:    ICD-10-CM ICD-9-CM   1. Decreased activities of daily living (ADL) Z78.9 V49.89   2. Impaired physical mobility Z74.09 781.99   3. Impaired gait and mobility R26.89 781.2     Patient Active Problem List   Diagnosis   • Syncope               Adult Rehabilitation Note       17 1327          Rehab Assessment/Intervention    Discipline physical therapy assistant  -LN      Document Type therapy note (daily note)  -LN      Subjective Information agree to therapy;no complaints  -LN      Precautions/Limitations fall precautions;other (see comments)   vs,especially bp  -LN      Recorded by [LN] Giuliana De La Torre PTA      Vital Signs    Pre Systolic BP Rehab 144  -LN      Pre Treatment Diastolic BP 58  -LN      Intra Systolic BP Rehab 168  -LN      Intra Treatment Diastolic BP 70  -LN      Post Systolic BP Rehab 158  -LN      Post Treatment Diastolic BP 58  -LN      Pretreatment Heart Rate (beats/min) 71  -LN      Intratreatment Heart Rate (beats/min) 78  -LN      Posttreatment Heart Rate (beats/min) 76  -LN      Pre SpO2 (%) 98  -LN      O2 Delivery Pre Treatment room air  -LN      Intra SpO2 (%) 96  -LN      Post SpO2 (%) 98  -LN      Pre Patient Position Supine  -LN      Intra Patient Position Standing  -LN      Post Patient Position Sitting  -LN      Recorded by [LN] Giuliana De La Torre PTA      Pain Assessment    Pain Assessment 0-10  -LN      Pain Score 0  -LN      Post Pain Score 0  -LN      Recorded by [LN] Giuliana De La Torre PTA      Cognitive Assessment/Intervention    Orientation Status oriented to;person     -LN      Recorded by [LN] Giuliana De La Torre PTA      Bed Mobility, Assessment/Treatment     Bed Mobility, Assistive Device bed rails;head of bed elevated  -LN      Bed Mobility, Roll Left, [place] not tested  -LN      Bed Mobility, Roll Right, [place] not tested  -LN      Bed Mobility, Scoot/Bridge, [place] not tested  -LN      Bed Mob, Supine to Sit, [place] supervision required  -LN      Bed Mob, Sit to Supine, [place] not tested  -LN      Recorded by [LN] Giuliana De La Torre, PTA      Transfer Assessment/Treatment    Transfers, Bed-Chair [place] not tested  -LN      Transfers, Chair-Bed [place] not tested  -LN      Transfers, Sit-Stand [place] supervision required  -LN      Transfers, Stand-Sit [place] supervision required  -LN      Transfers, Sit-Stand-Sit, Assist Device --   none  -LN      Toilet Transfer, [place] not tested  -LN      Recorded by [LN] Giuliana De La Torre, PTA      Gait Assessment/Treatment    Gait, [place] Level contact guard assist  -LN      Gait, Assistive Device --   none  -LN      Gait, Distance (Feet) 262   262  -LN      Recorded by [LN] Giuliana De La Torre, PTA      Positioning and Restraints    Post Treatment Position bed  -LN      In Bed sitting EOB;call light within reach;encouraged to call for assist;with family/caregiver  -LN      Recorded by [LN] Giuliana De La Torre, PTA        User Key  (r) = Recorded By, (t) = Taken By, (c) = Cosigned By    Initials Name Effective Dates    LN Giuliana De La Torre PTA 10/17/16 -                 IP PT Goals       11/11/17 1327 11/10/17 1606       Transfer Training PT LTG    Transfer Training PT LTG, Date Established  11/10/17  -TERRI     Transfer Training PT LTG, Time to Achieve  by discharge  -TERRI     Transfer Training PT LTG, Activity Type  bed to chair /chair to bed;sit to stand/stand to sit  -TERRI     Transfer Training PT LTG, [place] Level  independent  -TERRI     Transfer Training PT  LTG, Date Goal Reviewed 11/11/17  -LN      Transfer Training PT LTG, Outcome goal not met  -LN goal ongoing  -TERRI     Gait Training PT  LTG    Gait Training Goal PT LTG, Date Established  11/10/17  -     Gait Training Goal PT LTG, Time to Achieve  by discharge  -     Gait Training Goal PT LTG, [place] Level  independent;conditional independence  -     Gait Training Goal PT LTG, Distance to Achieve  530als3 with [facility]  -     Gait Training Goal PT LTG, Date Goal Reviewed 11/11/17  -      Gait Training Goal PT LTG, Outcome goal partially met  -LN goal ongoing  -     Stair Training PT LTG    Stair Training Goal PT LTG, Date Established  11/10/17  -     Stair Training Goal PT LTG, Time to Achieve  by discharge  -     Stair Training Goal PT LTG, Number of Steps  4  -     Stair Training Goal PT LTG, [place] Level  conditional independence  -     Stair Training Goal PT LTG, Assist Device  2 handrails  -     Stair Training Goal PT LTG, Date Goal Reviewed 11/11/17  -      Stair Training Goal PT LTG, Outcome goal not met  -LN goal ongoing  [facility]     Strength Goal PT LTG    Strength Goal PT LTG, Date Established  11/10/17  -     Strength Goal PT LTG, Time to Achieve  by discharge  -     Strength Goal PT LTG, Measure to Achieve  Pt will tolerate 15 reps of AROM exercises in sitting with [facility]  -     Strength Goal PT LTG, Functional Goal  Pt will progress to standing exercises  -     Strength Goal PT LTG, Date Goal Reviewed 11/11/17  -      Strength Goal PT LTG, Outcome goal not met  -LN goal ongoing  [facility]     Patient Education PT LTG    Patient Education PT LTG, Date Established  11/10/17  -     Patient Education PT LTG, Time to Achieve  by discharge  -     Patient Education PT LTG, Education Type  HEP;gait;transfers;home safety  -     Patient Education PT LTG, Date Goal Reviewed 11/11/17  -      Patient Education PT LTG Outcome goal not met  -LN goal ongoing  [facility]       User Key  (r) = Recorded By, (t) = Taken By, (c) = Cosigned By    Initials Name Provider Type    TERRI Joseph, PT Physical Therapist    DONALD HORTON  ALBARO De La Torre Physical Therapy Assistant          Physical Therapy Education     Title: PT OT SLP Therapies (Active)     Topic: Physical Therapy (Active)     Point: Mobility training (Active)    Learning Progress Summary    Learner Readiness Method Response Comment Documented by Status   Patient Acceptance E NR  LN 11/11/17 1522 Active               Point: Precautions (Active)    Learning Progress Summary    Learner Readiness Method Response Comment Documented by Status   Patient Acceptance E NR  LN 11/11/17 1522 Active    Acceptance E NR  TERRI 11/10/17 1605 Active                      User Key     Initials Effective Dates Name Provider Type Discipline     10/17/16 -  Radha Joseph, PT Physical Therapist PT     10/17/16 -  Giuliana De La Torre PTA Physical Therapy Assistant PT                    PT Recommendation and Plan  Anticipated Discharge Disposition: home with assist, home with home health  Planned Therapy Interventions: gait training, bed mobility training, balance training, patient/family education, stair training, strengthening, transfer training  PT Frequency: other (see comments) (5-14 times per week)  Plan of Care Review  Plan Of Care Reviewed With: patient  Progress: improving  Outcome Summary/Follow up Plan: sup-sit-stand sba of 1,amb 262'x2 w/o ad and cga of 1,slight increase bp with activity-no new goals met-if d/c today would benefit from hh pt/ot          Outcome Measures       11/11/17 1327 11/10/17 1455 11/10/17 0800    How much help from another person do you currently need...    Turning from your back to your side while in flat bed without using bedrails? 4  -LN 4  -TERRI     Moving from lying on back to sitting on the side of a flat bed without bedrails? 3  -LN 3  -TERRI     Moving to and from a bed to a chair (including a wheelchair)? 3  -LN 3  -TERRI     Standing up from a chair using your arms (e.g., wheelchair, bedside chair)? 3  -LN 3  -TERRI     Climbing 3-5 steps with a railing? 3  -LN 3  -TERRI     To walk  in hospital room? 3  -LN 3  -TERRI     AM-PAC 6 Clicks Score 19  -LN 19  -TERRI     How much help from another is currently needed...    Putting on and taking off regular lower body clothing?   3  -NN    Bathing (including washing, rinsing, and drying)   3  -NN    Toileting (which includes using toilet bed pan or urinal)   3  -NN    Putting on and taking off regular upper body clothing   4  -NN    Taking care of personal grooming (such as brushing teeth)   4  -NN    Eating meals   4  -NN    Score   21  -NN    Functional Assessment    Outcome Measure Options AM-PAC 6 Clicks Basic Mobility (PT)  -LN AM-PAC 6 Clicks Basic Mobility (PT)  -TERRI AM-PAC 6 Clicks Daily Activity (OT)  -NN      User Key  (r) = Recorded By, (t) = Taken By, (c) = Cosigned By    Initials Name Provider Type    TERRI Joseph, PT Physical Therapist    LN Giuliana De La Torre PTA Physical Therapy Assistant    NN Marcie Frederick, OTR/L Occupational Therapist           Time Calculation:         PT Charges       11/11/17 1327          Time Calculation    Start Time 1327  -LN      Stop Time 1400  -LN      Time Calculation (min) 33 min  -LN      PT Received On 11/11/17  -LN      Time Calculation- PT    Total Timed Code Minutes- PT 33 minute(s)  -LN        User Key  (r) = Recorded By, (t) = Taken By, (c) = Cosigned By    Initials Name Provider Type    DONALD De La Torre PTA Physical Therapy Assistant          Therapy Charges for Today     Code Description Service Date Service Provider Modifiers Qty    52836303359 HC GAIT TRAINING EA 15 MIN 11/11/2017 Giuliana De La Torre PTA GP, KX 1    79612668172 HC PT THERAPEUTIC ACT EA 15 MIN 11/11/2017 Giuliana De La Torre PTA GP, KX 1          PT G-Codes  PT Professional Judgement Used?: Yes  Outcome Measure Options: AM-PAC 6 Clicks Basic Mobility (PT)  Score: 19  Functional Limitation: Mobility: Walking and moving around  Mobility: Walking and Moving Around Current Status (): At least 20 percent but less than 40 percent impaired,  limited or restricted  Mobility: Walking and Moving Around Goal Status (): 0 percent impaired, limited or restricted    Giuliana De La Torre, PTA  11/11/2017

## 2017-11-11 NOTE — CONSULTS
"[facility]  [address] [place] [address]. [address]   - [phone]  F  [phone]     Consultation         PATIENT  DEMOGRAPHICS   PATIENT NAME: May Chavez                      PHYSICIAN: Nereyda Leach MD  : 1960  MRN: [id number]    Subjective   SUBJECTIVE   Referring Provider: Dr. Damon  Reason for Consultation: Acute kidney injury  History of present illness:  Mrs. Diaz is a pleasant 57-year-old  female with past medical history of multiple medical problems as listed below who was transferred from [facility] yesterday because of syncope.  She has history of hypertension for which she is on amlodipine and labetalol she developed some orthostatic hypotension and was started on midodrine in addition to the above medicines.  Reviewing her records showed that her serum creatinine has been fluctuating between 1.1 and 1.7 mg/dL.  It is reported that her serum creatinine admission was up to 2.3 mg/dL and went down to 1.2 as of yesterday and this morning up to 1.7 again.  She is not aware of any renal disease in the past and has not had any workup done previously.    Past Medical History:   Diagnosis Date   • CHF (congestive heart failure)    • Diabetes mellitus    • Hypertension      Past Surgical History:   Procedure Laterality Date   • NEPHRECTOMY Right      History reviewed. No pertinent family history.  Social History   Substance Use Topics   • Smoking status: Never Smoker   • Smokeless tobacco: None   • Alcohol use No     Allergies:  Penicillins     REVIEW OF SYSTEMS    Review of Systems   Negative except for what mentioned in history of present illness    Objective   OBJECTIVE   Vital Signs  Temp:  [96.3 °F (35.7 °C)-97.1 °F (36.2 °C)] 96.6 °F (35.9 °C)  Heart Rate:  [69-80] 69  Resp:  [18-20] 18  BP: (136-170)/(64-87) 145/67    Flowsheet Rows         First Filed Value    Admission Height  65\" (165.1 cm) Documented at " 11/07/2017 2355    Admission Weight  217 lb (98.4 kg) Documented at 11/07/2017 2355           I/O last 3 completed shifts:  In: 720 [P.O.:720]  Out: -     PHYSICAL EXAM    Physical Exam   Gen. pleasant lady who does not seem to be in acute respiratory distress, alert and oriented ×3.  HEENT head is normocephalic/atraumatic pupils are equal and reactive, oral mucosal membranes are moist.  Neck is supple jugular veins are not distended no carotid bruits.  Lungs fair air entry bilaterally.  Heart regular rate and rhythm no gallop or murmurs.  Abdomen soft and tender distended bowel sounds are present.  Extremities no significant edema    RESULTS   Results Review:      Results from last 7 days  Lab Units 11/11/17  0607 11/10/17  0339 11/09/17  0547 11/08/17  0302   SODIUM mmol/L 138 139 144 143   POTASSIUM mmol/L 4.3 4.3 4.4 4.3   CHLORIDE mmol/L 108 110 114* 109   CO2 mmol/L 23.0 22.0 21.0* 22.0   BUN mg/dL 31* 27* 30* 38*   CREATININE mg/dL 1.60* 1.17* 1.23* 1.69*   CALCIUM mg/dL 8.1* 8.2* 8.2* 8.7   BILIRUBIN mg/dL  --   --   --  0.3   ALK PHOS U/L  --   --   --  98   ALT (SGPT) U/L  --   --   --  40   AST (SGOT) U/L  --   --   --  34   GLUCOSE mg/dL 115* 106* 98 84       Estimated Creatinine Clearance: 44 mL/min (by C-G formula based on Cr of 1.6).      Results from last 7 days  Lab Units 11/08/17  0302   MAGNESIUM mg/dL 2.0               Results from last 7 days  Lab Units 11/11/17  0607 11/10/17  0339 11/09/17  0547 11/08/17  0302   WBC 10*3/mm3 6.24 6.00 5.17 7.57   HEMOGLOBIN g/dL 9.6* 10.5* 10.3* 11.1*   PLATELETS 10*3/mm3 176 178 183 184         Results from last 7 days  Lab Units 11/08/17  0302   INR  1.07        MEDICATIONS      amLODIPine 10 mg Oral Q24H   aspirin 81 mg Oral Daily   citalopram 40 mg Oral Daily   enoxaparin 30 mg Subcutaneous Daily   famotidine 40 mg Oral Daily   glipiZIDE 5 mg Oral QAM AC   insulin aspart 0-9 Units Subcutaneous 4x Daily AC & at Bedtime   labetalol 100 mg Oral Q12H        sodium chloride 100 mL/hr     Prescriptions Prior to Admission   Medication Sig Dispense Refill Last Dose   • aspirin 81 MG chewable tablet Chew 81 mg Daily.   11/7/2017 at Unknown time   • citalopram (CeleXA) 40 MG tablet Take 40 mg by mouth Daily.   11/7/2017   • fenofibrate 160 MG tablet Take 160 mg by mouth Daily.   11/7/2017 at Unknown time   • glyBURIDE (DIAbeta) 5 MG tablet Take 5 mg by mouth Daily With Breakfast.   11/7/2017   • lisinopril (PRINIVIL,ZESTRIL) 20 MG tablet Take 20 mg by mouth Daily.   11/7/2017   • losartan (COZAAR) 25 MG tablet Take 25 mg by mouth Daily.   11/7/2017     Assessment/Plan   ASSESSMENT / PLAN    Active Problems:    Syncope    1.  Possible acute kidney injury superimposed on chronic kidney disease stage III, could be related to significant fluctuation in the blood pressure.  No nephrotoxins were given in the last 48-72 hours.  No clinical evidence of obstruction.  Could be related to fenofibrate  2.  Hypertension not well controlled.  3.  Type 2 diabetes mellitus  4.  Dyslipidemia  Plan  1.  I will discontinue fenofibrate and midodrine for the time being.  We will continue with labetalol and amlodipine.  Will avoid ACE inhibitor and angiotensin receptor blockers at least temporarily.  2.  Urinalysis and urine indices.  3.  We'll check a renal ultrasound.  4.  We'll obtain a renal ultrasound to evaluate the kidney sizes and rule out an obstructive component  5.  We'll follow renal parameters closely    Thank you very much for involving me in the care of this pleasant lady         I discussed the patients findings and my recommendations with Patient.         This document has been electronically signed by Nereyda Leach MD on November 11, 2017 2:37 PM

## 2017-11-11 NOTE — PLAN OF CARE
Problem: Patient Care Overview (Adult)  Goal: Plan of Care Review  Outcome: Ongoing (interventions implemented as appropriate)    11/11/17 1327   Coping/Psychosocial Response Interventions   Plan Of Care Reviewed With patient   Patient Care Overview   Progress improving   Outcome Evaluation   Outcome Summary/Follow up Plan sup-sit-stand sba of 1,amb 262'x2 w/o ad and cga of 1,slight increase bp with activity-no new goals met-if d/c today would benefit from hh pt/ot       Goal: Discharge Needs Assessment  Outcome: Ongoing (interventions implemented as appropriate)    11/10/17 1543 11/11/17 1327   Discharge Needs Assessment   Concerns To Be Addressed denies needs/concerns at this time --    Readmission Within The Last 30 Days no previous admission in last 30 days --    Equipment Needed After Discharge none --    Discharge Facility/Level Of Care Needs --  home with home health   Current Discharge Risk chronically ill --    Discharge Disposition still a patient --    Living Environment   Transportation Available family or friend will provide --    Self-Care   Equipment Currently Used at Home glucometer;grab bar;other (see comments)  (has blood pressure monitior and scales to weigh) --          Problem: Inpatient Physical Therapy  Goal: Transfer Training Goal 1 LTG- PT  Outcome: Ongoing (interventions implemented as appropriate)    11/10/17 1606 11/11/17 1327   Transfer Training PT LTG   Transfer Training PT LTG, Date Established 11/10/17 --    Transfer Training PT LTG, Time to Achieve by discharge --    Transfer Training PT LTG, Activity Type bed to chair /chair to bed;sit to stand/stand to sit --    Transfer Training PT LTG, [place] Level independent --    Transfer Training PT LTG, Date Goal Reviewed --  11/11/17   Transfer Training PT LTG, Outcome --  goal not met       Goal: Gait Training Goal LTG- PT  Outcome: Ongoing (interventions implemented as appropriate)    11/10/17 1606 11/11/17 1327   Gait Training PT  LTG   Gait Training Goal PT LTG, Date Established 11/10/17 --    Gait Training Goal PT LTG, Time to Achieve by discharge --    Gait Training Goal PT LTG, [place] Level independent;conditional independence --    Gait Training Goal PT LTG, Distance to Achieve [id number] with VSS --    Gait Training Goal PT LTG, Date Goal Reviewed --  11/11/17   Gait Training Goal PT LTG, Outcome --  goal partially met       Goal: Stair Training Goal LTG- PT  Outcome: Ongoing (interventions implemented as appropriate)    11/10/17 1606 11/11/17 1327   Stair Training PT LTG   Stair Training Goal PT LTG, Date Established 11/10/17 --    Stair Training Goal PT LTG, Time to Achieve by discharge --    Stair Training Goal PT LTG, Number of Steps 4 --    Stair Training Goal PT LTG, [place] Level conditional independence --    Stair Training Goal PT LTG, Assist Device 2 handrails --    Stair Training Goal PT LTG, Date Goal Reviewed --  11/11/17   Stair Training Goal PT LTG, Outcome --  goal not met       Goal: Strength Goal LTG- PT  Outcome: Ongoing (interventions implemented as appropriate)    11/10/17 1606 11/11/17 1327   Strength Goal PT LTG   Strength Goal PT LTG, Date Established 11/10/17 --    Strength Goal PT LTG, Time to Achieve by discharge --    Strength Goal PT LTG, Measure to Achieve Pt will tolerate 15 reps of AROM exercises in sitting with VSS --    Strength Goal PT LTG, Functional Goal Pt will progress to standing exercises --    Strength Goal PT LTG, Date Goal Reviewed --  11/11/17   Strength Goal PT LTG, Outcome --  goal not met       Goal: Patient Education Goal LTG- PT  Outcome: Ongoing (interventions implemented as appropriate)    11/10/17 1606 11/11/17 1327   Patient Education PT LTG   Patient Education PT LTG, Date Established 11/10/17 --    Patient Education PT LTG, Time to Achieve by discharge --    Patient Education PT LTG, Education Type HEP;gait;transfers;home safety --    Patient Education PT LTG, Date Goal  Reviewed --  11/11/17   Patient Education PT LTG Outcome --  goal not met

## 2017-11-11 NOTE — PROGRESS NOTES
[facility] Medicine Services  INPATIENT PROGRESS NOTE    Length of Stay: 3  Date of Admission: 11/8/2017  Primary Care Physician: RONI Travis    Subjective   No chief complaint on file.  Syncope    HPI:  57-year-old female with history of hypertension, depression, hyperlipidemia, numbness or failure, diabetes and obesity who was transferred from just her hospital for syncope and bradycardia patient was found to have a heart rate in low to mid 40s.  Patient was given dose of atropine with some improvement.    11/9/2017: Patient heart rate has been in 70s the whole time she is been here.  She has been stopped on her beta blocker.  Patient state that she become dizzy as she get out of bed however it improves once she hold on to something and take her time before she moved.    11/10/2017: Pt had episode where her BP is raising up to 200s, pt was not able to ambulate due to elevated BP. She positive for orthostatic hypotension.     11/11/2017: Pt states that she is feeling better, her dizziness has improved. She denies any other complain. Will another set of orthostatic vitals. Mild raise in Cr. Discussed with Dr. Duron he will see the pt.     Review of Systems   HENT: Negative for rhinorrhea.    Respiratory: Negative for chest tightness, shortness of breath and wheezing.    Cardiovascular: Negative for chest pain, palpitations and leg swelling.   Genitourinary: Negative for dysuria and urgency.   Musculoskeletal: Negative for back pain.   Neurological: Positive for dizziness. Negative for light-headedness.   Hematological: Negative for adenopathy. Does not bruise/bleed easily.   Psychiatric/Behavioral: Negative for agitation and behavioral problems.        All pertinent negatives and positives are as above. All other systems have been reviewed and are negative unless otherwise stated.     Objective      Vital Sign Min/Max for last 24 hours  Temp  Min: 96.3 °F (35.7 °C)   Max: 97.1 °F (36.2 °C)   BP  Min: 136/65  Max: 170/82   Pulse  Min: 70  Max: 80   Resp  Min: 18  Max: 20   SpO2  Min: 97 %  Max: 98 %   No Data Recorded   No Data Recorded         Physical Exam   Constitutional: She is oriented to person, place, and time. She appears well-developed and well-nourished.   HENT:   Head: Normocephalic and atraumatic.   Eyes: EOM are normal. Pupils are equal, round, and reactive to light.   Neck: Normal range of motion.   Cardiovascular: Normal rate, regular rhythm and normal heart sounds.    Pulmonary/Chest: Effort normal and breath sounds normal.   Abdominal: Soft. Bowel sounds are normal.   Musculoskeletal: Normal range of motion.   Neurological: She is alert and oriented to person, place, and time.   Skin: Skin is warm.   Psychiatric: She has a normal mood and affect. Her behavior is normal.   Vitals reviewed.      Current Facility-Administered Medications   Medication Dose Route Frequency Provider Last Rate Last Dose   • acetaminophen (TYLENOL) tablet 650 mg  650 mg Oral Q4H PRN Dom Torrez MD   650 mg at 11/08/17 0342   • amLODIPine (NORVASC) tablet 10 mg  10 mg Oral Q24H Williams Damon MD   10 mg at 11/11/17 0816   • aspirin chewable tablet 81 mg  81 mg Oral Daily Dom Torrez MD   81 mg at 11/11/17 0819   • bisacodyl (DULCOLAX) EC tablet 5 mg  5 mg Oral Daily PRN Dom Torrez MD       • citalopram (CeleXA) tablet 40 mg  40 mg Oral Daily Dom Torrez MD   40 mg at 11/11/17 0816   • dextrose (D50W) solution 25 g  25 g Intravenous Q15 Min PRN Dom Torrez MD       • dextrose (GLUTOSE) oral gel 15 g  15 g Oral Q15 Min PRN Dom Torrez MD       • enoxaparin (LOVENOX) syringe 30 mg  30 mg Subcutaneous Daily Dom Torrez MD   30 mg at 11/11/17 0816   • famotidine (PEPCID) tablet 40 mg  40 mg Oral Daily Dom Torrez MD   40 mg at 11/11/17 0819   • fenofibrate (TRICOR) tablet 145 mg  145 mg Oral Daily Dom DYLAN Torrez MD   145 mg at  11/11/17 0816   • glipiZIDE (GLUCOTROL) tablet 5 mg  5 mg Oral QAM AC Dom Torrez MD   5 mg at 11/11/17 0816   • glucagon (human recombinant) (GLUCAGEN DIAGNOSTIC) injection 1 mg  1 mg Subcutaneous Q15 Min PRN Dom Torrez MD       • hydrALAZINE (APRESOLINE) injection 10 mg  10 mg Intravenous Q6H PRN Dom Torrez MD   10 mg at 11/10/17 0426   • insulin aspart (novoLOG) injection 0-9 Units  0-9 Units Subcutaneous 4x Daily AC & at Bedtime Dom Torrez MD   7 Units at 11/10/17 2010   • labetalol (NORMODYNE) tablet 100 mg  100 mg Oral Q12H Williams Damon MD   100 mg at 11/11/17 0816   • midodrine (PROAMATINE) tablet 2.5 mg  2.5 mg Oral BID AC Williams Damon MD   2.5 mg at 11/11/17 0816   • ondansetron (ZOFRAN) injection 4 mg  4 mg Intravenous Q6H PRN Dom Torrez MD       • sodium chloride 0.9 % flush 1-10 mL  1-10 mL Intravenous PRN Dom Torrez MD               Results Review:  I have reviewed the labs, radiology results, and diagnostic studies.    Laboratory Data:     Results from last 7 days  Lab Units 11/11/17  0607 11/10/17  0339 11/09/17  0547 11/08/17  0302   SODIUM mmol/L 138 139 144 143   POTASSIUM mmol/L 4.3 4.3 4.4 4.3   CHLORIDE mmol/L 108 110 114* 109   CO2 mmol/L 23.0 22.0 21.0* 22.0   BUN mg/dL 31* 27* 30* 38*   CREATININE mg/dL 1.60* 1.17* 1.23* 1.69*   GLUCOSE mg/dL 115* 106* 98 84   CALCIUM mg/dL 8.1* 8.2* 8.2* 8.7   BILIRUBIN mg/dL  --   --   --  0.3   ALK PHOS U/L  --   --   --  98   ALT (SGPT) U/L  --   --   --  40   AST (SGOT) U/L  --   --   --  34   ANION GAP mmol/L 7.0 7.0 9.0 12.0     Estimated Creatinine Clearance: 44 mL/min (by C-G formula based on Cr of 1.6).    Results from last 7 days  Lab Units 11/08/17  0302   MAGNESIUM mg/dL 2.0           Results from last 7 days  Lab Units 11/11/17  0607 11/10/17  0339 11/09/17  0547 11/08/17  0302   WBC 10*3/mm3 6.24 6.00 5.17 7.57   HEMOGLOBIN g/dL 9.6* 10.5* 10.3* 11.1*   HEMATOCRIT % 28.7* 31.4* 31.0* 32.3*    PLATELETS 10*3/mm3 176 178 183 184       Results from last 7 days  Lab Units 11/08/17  0302   INR  1.07           Culture Data:   No results found for: BLOODCX  No results found for: URINECX  No results found for: RESPCX  No results found for: WOUNDCX  No results found for: STOOLCX  No components found for: BODYFLD       Radiology Data:   Imaging Results (last 24 hours)     ** No results found for the last 24 hours. **          I have reviewed the patient current medications.     Assessment/Plan     Active Hospital Problems (** Indicates Principal Problem)    Diagnosis Date Noted   • Syncope [R55] 11/08/2017      Resolved Hospital Problems    Diagnosis Date Noted Date Resolved   No resolved problems to display.     #1.  Syncope likely secondary to the bradycardia: Patient heart rate has been in 60s and 70s all times she is in the hospital.  We'll continue to monitor.  Her echocardiogram was normal, her carotid ultrasound showed right sided 50-69% occlusion.  We'll obtain orthostatic vitals.  #2.  Acute kidney injury: Creatinine has worsened again today. Discussed with Dr. Duron he will see the pt.   #3.  Hyperkalemia: Resolved.  #4.  Essential hypertension: continue to with norvasc, she was started on labetalol and midodrine. Will obtain another set of orthostatic vitals. Ambulate with nurse or PT/OT today.   #5.  Diabetes mellitus: A1c is 6.5, continue with the sliding scale.  #6. Orthostatic hypotension: will start pt on labetalol and midodrine.   #7. Anemia: will obtain anemia work up, drop in H&H, monitor.     Discharge Planning: I expect patient to be discharged to home in 1-2 days.      DVT Prophylaxis: SCD/TEDs               This document has been electronically signed by Williams Damon MD on November 11, 2017 12:06 PM

## 2017-11-12 ENCOUNTER — APPOINTMENT (OUTPATIENT)
Dept: ULTRASOUND IMAGING | Facility: HOSPITAL | Age: 57
End: 2017-11-12

## 2017-11-12 VITALS
RESPIRATION RATE: 18 BRPM | HEIGHT: 65 IN | DIASTOLIC BLOOD PRESSURE: 78 MMHG | SYSTOLIC BLOOD PRESSURE: 156 MMHG | TEMPERATURE: 97.3 F | HEART RATE: 72 BPM | WEIGHT: 217.2 LBS | BODY MASS INDEX: 36.19 KG/M2 | OXYGEN SATURATION: 94 %

## 2017-11-12 LAB
ALBUMIN SERPL-MCNC: 3 G/DL (ref 3.4–4.8)
ALBUMIN/GLOB SERPL: 1 G/DL (ref 1.1–1.8)
ALP SERPL-CCNC: 58 U/L (ref 38–126)
ALT SERPL W P-5'-P-CCNC: 22 U/L (ref 9–52)
ANION GAP SERPL CALCULATED.3IONS-SCNC: 8 MMOL/L (ref 5–15)
AST SERPL-CCNC: 19 U/L (ref 14–36)
BASOPHILS # BLD MANUAL: 0.07 10*3/MM3 (ref 0–0.2)
BASOPHILS NFR BLD AUTO: 1 % (ref 0–2)
BILIRUB SERPL-MCNC: 0.3 MG/DL (ref 0.2–1.3)
BUN BLD-MCNC: 37 MG/DL (ref 7–21)
BUN/CREAT SERPL: 27.6 (ref 7–25)
CALCIUM SPEC-SCNC: 8.3 MG/DL (ref 8.4–10.2)
CHLORIDE SERPL-SCNC: 111 MMOL/L (ref 95–110)
CO2 SERPL-SCNC: 19 MMOL/L (ref 22–31)
CREAT BLD-MCNC: 1.34 MG/DL (ref 0.5–1)
DEPRECATED RDW RBC AUTO: 44.6 FL (ref 36.4–46.3)
EOSINOPHIL # BLD MANUAL: 0.07 10*3/MM3 (ref 0–0.7)
EOSINOPHIL NFR BLD MANUAL: 1 % (ref 0–7)
ERYTHROCYTE [DISTWIDTH] IN BLOOD BY AUTOMATED COUNT: 14 % (ref 11.5–14.5)
GFR SERPL CREATININE-BSD FRML MDRD: 40 ML/MIN/1.73 (ref 60–120)
GLOBULIN UR ELPH-MCNC: 3 GM/DL (ref 2.3–3.5)
GLUCOSE BLD-MCNC: 112 MG/DL (ref 60–100)
GLUCOSE BLDC GLUCOMTR-MCNC: 112 MG/DL (ref 70–130)
GLUCOSE BLDC GLUCOMTR-MCNC: 129 MG/DL (ref 70–130)
GLUCOSE BLDC GLUCOMTR-MCNC: 88 MG/DL (ref 70–130)
HCT VFR BLD AUTO: 28 % (ref 35–45)
HGB BLD-MCNC: 9.4 G/DL (ref 12–15.5)
LYMPHOCYTES # BLD MANUAL: 1.23 10*3/MM3 (ref 0.6–4.2)
LYMPHOCYTES NFR BLD MANUAL: 18 % (ref 10–50)
LYMPHOCYTES NFR BLD MANUAL: 5 % (ref 0–12)
MCH RBC QN AUTO: 29 PG (ref 26.5–34)
MCHC RBC AUTO-ENTMCNC: 33.6 G/DL (ref 31.4–36)
MCV RBC AUTO: 86.4 FL (ref 80–98)
MONOCYTES # BLD AUTO: 0.34 10*3/MM3 (ref 0–0.9)
NEUTROPHILS # BLD AUTO: 5.12 10*3/MM3 (ref 2–8.6)
NEUTROPHILS NFR BLD MANUAL: 75 % (ref 37–80)
PLAT MORPH BLD: NORMAL
PLATELET # BLD AUTO: 186 10*3/MM3 (ref 150–450)
PMV BLD AUTO: 10.3 FL (ref 8–12)
POTASSIUM BLD-SCNC: 5.1 MMOL/L (ref 3.5–5.1)
PROT SERPL-MCNC: 6 G/DL (ref 6.3–8.6)
RBC # BLD AUTO: 3.24 10*6/MM3 (ref 3.77–5.16)
RBC MORPH BLD: NORMAL
SODIUM BLD-SCNC: 138 MMOL/L (ref 137–145)
WBC MORPH BLD: NORMAL
WBC NRBC COR # BLD: 6.83 10*3/MM3 (ref 3.2–9.8)

## 2017-11-12 PROCEDURE — 97755 ASSISTIVE TECHNOLOGY ASSESS: CPT

## 2017-11-12 PROCEDURE — 97535 SELF CARE MNGMENT TRAINING: CPT

## 2017-11-12 PROCEDURE — 97110 THERAPEUTIC EXERCISES: CPT

## 2017-11-12 PROCEDURE — 85007 BL SMEAR W/DIFF WBC COUNT: CPT | Performed by: INTERNAL MEDICINE

## 2017-11-12 PROCEDURE — 97530 THERAPEUTIC ACTIVITIES: CPT

## 2017-11-12 PROCEDURE — 76775 US EXAM ABDO BACK WALL LIM: CPT

## 2017-11-12 PROCEDURE — 80053 COMPREHEN METABOLIC PANEL: CPT | Performed by: INTERNAL MEDICINE

## 2017-11-12 PROCEDURE — 97116 GAIT TRAINING THERAPY: CPT

## 2017-11-12 PROCEDURE — 85025 COMPLETE CBC W/AUTO DIFF WBC: CPT | Performed by: INTERNAL MEDICINE

## 2017-11-12 PROCEDURE — 82962 GLUCOSE BLOOD TEST: CPT

## 2017-11-12 PROCEDURE — 25010000002 ENOXAPARIN PER 10 MG: Performed by: INTERNAL MEDICINE

## 2017-11-12 RX ORDER — AMLODIPINE BESYLATE 10 MG/1
10 TABLET ORAL
Qty: 15 TABLET | Refills: 0 | Status: SHIPPED | OUTPATIENT
Start: 2017-11-13

## 2017-11-12 RX ORDER — LABETALOL 200 MG/1
200 TABLET, FILM COATED ORAL EVERY 12 HOURS SCHEDULED
Qty: 30 TABLET | Refills: 0 | Status: SHIPPED | OUTPATIENT
Start: 2017-11-12

## 2017-11-12 RX ORDER — LABETALOL 200 MG/1
200 TABLET, FILM COATED ORAL EVERY 12 HOURS SCHEDULED
Status: DISCONTINUED | OUTPATIENT
Start: 2017-11-12 | End: 2017-11-12 | Stop reason: HOSPADM

## 2017-11-12 RX ADMIN — FAMOTIDINE 40 MG: 40 TABLET ORAL at 08:11

## 2017-11-12 RX ADMIN — ASPIRIN 81 MG 81 MG: 81 TABLET ORAL at 08:11

## 2017-11-12 RX ADMIN — GLIPIZIDE 5 MG: 5 TABLET ORAL at 08:11

## 2017-11-12 RX ADMIN — LABETALOL HYDROCHLORIDE 100 MG: 100 TABLET, FILM COATED ORAL at 08:11

## 2017-11-12 RX ADMIN — CITALOPRAM HYDROBROMIDE 40 MG: 40 TABLET ORAL at 08:11

## 2017-11-12 RX ADMIN — ENOXAPARIN SODIUM 30 MG: 30 INJECTION SUBCUTANEOUS at 08:11

## 2017-11-12 RX ADMIN — AMLODIPINE BESYLATE 10 MG: 10 TABLET ORAL at 08:11

## 2017-11-12 NOTE — THERAPY TREATMENT NOTE
Acute Care - Physical Therapy Treatment Note  [facility]     Patient Name: May Chavez  : 1960  MRN: [id number]  Today's Date: 2017  Onset of Illness/Injury or Date of Surgery Date: 17  Date of Referral to PT: 17  Referring Physician: Dr. Williams Damon    Admit Date: 2017    Visit Dx:    ICD-10-CM ICD-9-CM   1. Decreased activities of daily living (ADL) Z78.9 V49.89   2. Impaired physical mobility Z74.09 781.99   3. Impaired gait and mobility R26.89 781.2     Patient Active Problem List   Diagnosis   • Syncope               Adult Rehabilitation Note       17 0855 17 1327       Rehab Assessment/Intervention    Discipline physical therapy assistant  -LN physical therapy assistant  -LN     Document Type therapy note (daily note)  -LN therapy note (daily note)  -LN     Subjective Information agree to therapy;no complaints  -LN agree to therapy;no complaints  -LN     Precautions/Limitations fall precautions   vs especially bp  -LN fall precautions;other (see comments)   vs,especially bp  -LN     Recorded by [LN] Giuliana De La Torre PTA [LN] Giuliana De La Torre PTA     Vital Signs    Pre Systolic BP Rehab 178  -  -LN     Pre Treatment Diastolic BP 64  -LN 58  -LN     Intra Systolic BP Rehab 168  -  -LN     Intra Treatment Diastolic BP 68  -LN 70  -LN     Post Systolic BP Rehab 162  -  -LN     Post Treatment Diastolic BP 60  -LN 58  -LN     Pretreatment Heart Rate (beats/min) 68  -LN 71  -LN     Intratreatment Heart Rate (beats/min) 68  -LN 78  -LN     Posttreatment Heart Rate (beats/min) 67  -LN 76  -LN     Pre SpO2 (%) 98  -LN 98  -LN     O2 Delivery Pre Treatment room air  -LN room air  -LN     Intra SpO2 (%) 98  -LN 96  -LN     Post SpO2 (%) 99  -LN 98  -LN     Pre Patient Position Supine  -LN Supine  -LN     Intra Patient Position Standing  -LN Standing  -LN     Post Patient Position Sitting  -LN Sitting  -LN     Recorded by [LN] Giuliana De La Torre PTA [LN] Giuliana  CLIFFORD De La Torre, ALBARO     Pain Assessment    Pain Assessment 0-10  -LN 0-10  -LN     Pain Score 0  -LN 0  -LN     Post Pain Score 0  -LN 0  -LN     Recorded by [LN] Giuliana De La Torre, PTA [LN] Giuliana De La Torre, ALBARO     Cognitive Assessment/Intervention    Orientation Status oriented to;person;place     -LN oriented to;person     -LN     Recorded by [LN] Giuliana De La Torre, PTA [LN] Giuliana De La Torre, PTA     Bed Mobility, Assessment/Treatment    Bed Mobility, Assistive Device  bed rails;head of bed elevated  -LN     Bed Mobility, Roll Left, [place] not tested  -LN not tested  -LN     Bed Mobility, Roll Right, [place] not tested  -LN not tested  -LN     Bed Mobility, Scoot/Bridge, [place] independent  -LN not tested  -LN     Bed Mob, Supine to Sit, [place] independent  -LN supervision required  -LN     Bed Mob, Sit to Supine, [place] not tested  -LN not tested  -LN     Recorded by [LN] Giuliana De La Torre, PTA [LN] Giuliana De La Torre, ALBARO     Transfer Assessment/Treatment    Transfers, Bed-Chair [place] not tested  -LN not tested  -LN     Transfers, Chair-Bed [place] not tested  -LN not tested  -LN     Transfers, Sit-Stand [place] independent  -LN supervision required  -LN     Transfers, Stand-Sit [place] independent  -LN supervision required  -LN     Transfers, Sit-Stand-Sit, Assist Device --   none  -LN --   none  -LN     Toilet Transfer, [place] not tested  -LN not tested  -LN     Recorded by [LN] Giuliana De La Torre, PTA [LN] Giuliana De La Torre, ALBARO     Gait Assessment/Treatment    Gait, [place] Level stand by assist;contact guard assist  -LN contact guard assist  -LN     Gait, Assistive Device --   none  -LN --   none  -LN     Gait, Distance (Feet) 250  -   262  -LN     Gait, Gait Deviations karri decreased   doesn't walk a straight line but no lob  -LN      Recorded by [LN] Giuliana De La Torre, PTA [LN] Giuliana De La Torre, PTA     Stairs Assessment/Treatment    Number of Stairs 5  -LN      Stairs, Handrail  Location both sides  -LN      Stairs, [place] Level independent  -LN      Stairs, Technique Used step over step (ascending);step over step (descending)  -LN      Recorded by [LN] Giuliana De La Torre PTA      Therapy Exercises    Bilateral Lower Extremities AROM:;15 reps;sitting;ankle pumps/circles;hip flexion;LAQ  -LN      Recorded by [LN] Giuliana De La Torre PTA      Positioning and Restraints    Post Treatment Position bed  -LN bed  -LN     In Bed notified nsg;sitting EOB;call light within reach;encouraged to call for assist;with family/caregiver  -LN sitting EOB;call light within reach;encouraged to call for assist;with family/caregiver  -LN     Recorded by [LN] Giuliana De La Torre, PTA [LN] Giuliana De La Torre, ALBARO       User Key  (r) = Recorded By, (t) = Taken By, (c) = Cosigned By    Initials Name Effective Dates    LN Giuliana De La Torre PTA 10/17/16 -                 IP PT Goals       11/12/17 0855 11/11/17 1327 11/10/17 1606    Transfer Training PT LTG    Transfer Training PT LTG, Date Established   11/10/17  -TERRI    Transfer Training PT LTG, Time to Achieve   by discharge  -TERRI    Transfer Training PT LTG, Activity Type   bed to chair /chair to bed;sit to stand/stand to sit  -TERRI    Transfer Training PT LTG, [place] Level   independent  -TERRI    Transfer Training PT  LTG, Date Goal Reviewed 11/12/17  -LN 11/11/17  -LN     Transfer Training PT LTG, Outcome goal not met  -LN goal not met  -LN goal ongoing  -TERRI    Gait Training PT LTG    Gait Training Goal PT LTG, Date Established   11/10/17  -TERRI    Gait Training Goal PT LTG, Time to Achieve   by discharge  -TERRI    Gait Training Goal PT LTG, [place] Level   independent;conditional independence  -TERRI    Gait Training Goal PT LTG, Distance to Achieve   [id number] with VSS  -TERRI    Gait Training Goal PT LTG, Date Goal Reviewed 11/12/17  -LN 11/11/17  -LN     Gait Training Goal PT LTG, Outcome goal not met  -LN goal partially met  -LN goal ongoing  -TERRI    Stair Training PT LTG    Stair  Training Goal PT LTG, Date Established   11/10/17  -    Stair Training Goal PT LTG, Time to Achieve   by discharge  -    Stair Training Goal PT LTG, Number of Steps   4  -    Stair Training Goal PT LTG, [place] Level   conditional independence  -    Stair Training Goal PT LTG, Assist Device   2 handrails  -    Stair Training Goal PT LTG, Date Goal Reviewed 11/12/17  -LN 11/11/17  -LN     Stair Training Goal PT LTG, Outcome goal met  -LN goal not met  -LN goal ongoing  -    Strength Goal PT LTG    Strength Goal PT LTG, Date Established   11/10/17  -    Strength Goal PT LTG, Time to Achieve   by discharge  -    Strength Goal PT LTG, Measure to Achieve   Pt will tolerate 15 reps of AROM exercises in sitting with VSS  -    Strength Goal PT LTG, Functional Goal   Pt will progress to standing exercises  -    Strength Goal PT LTG, Date Goal Reviewed 11/12/17  -LN 11/11/17  -LN     Strength Goal PT LTG, Outcome goal not met  -LN goal not met  -LN goal ongoing  -    Patient Education PT LTG    Patient Education PT LTG, Date Established   11/10/17  -    Patient Education PT LTG, Time to Achieve   by discharge  -    Patient Education PT LTG, Education Type   HEP;gait;transfers;home safety  -    Patient Education PT LTG, Date Goal Reviewed 11/12/17  -LN 11/11/17  -LN     Patient Education PT LTG Outcome goal met  -LN goal not met  -LN goal ongoing  [facility]      User Key  (r) = Recorded By, (t) = Taken By, (c) = Cosigned By    Initials Name Provider Type    TERRI Radha Joseph, PT Physical Therapist    DONALD De La Torre PTA Physical Therapy Assistant          Physical Therapy Education     Title: PT OT SLP Therapies (Active)     Topic: Physical Therapy (Done)     Point: Mobility training (Done)    Learning Progress Summary    Learner Readiness Method Response Comment Documented by Status   Patient Acceptance E JOSE KEVIN hep and home safety-handouts given LN 11/12/17 1127 Done    Acceptance E NR  LN 11/11/17  1522 Active               Point: Home exercise program (Done)    Learning Progress Summary    Learner Readiness Method Response Comment Documented by Status   Patient Acceptance E VU,DU hep and home safety-handouts given LN 11/12/17 1127 Done               Point: Precautions (Done)    Learning Progress Summary    Learner Readiness Method Response Comment Documented by Status   Patient Acceptance E VU,DU hep and home safety-handouts given LN 11/12/17 1127 Done    Acceptance E NR  LN 11/11/17 1522 Active    Acceptance E NR   11/10/17 1605 Active                      User Key     Initials Effective Dates Name Provider Type Discipline     10/17/16 -  Radha Joseph, PT Physical Therapist PT     10/17/16 -  Giuliana De La Torre, PTA Physical Therapy Assistant PT                    PT Recommendation and Plan  Anticipated Discharge Disposition: home with assist, home with home health  Planned Therapy Interventions: gait training, bed mobility training, balance training, patient/family education, stair training, strengthening, transfer training  PT Frequency: other (see comments) (5-14 times per week)  Plan of Care Review  Plan Of Care Reviewed With: patient  Progress: improving  Outcome Summary/Follow up Plan: sup-sit-stand-sit ind,amb 250' w/o ad and sba/cga of 1,up/down steps ind,2 new goals met-if d/c today would benefit from 24/7 sup and hh pt/ot          Outcome Measures       11/12/17 0855 11/11/17 1327 11/10/17 1455    How much help from another person do you currently need...    Turning from your back to your side while in flat bed without using bedrails? 4  -LN 4  -LN 4  -TERRI    Moving from lying on back to sitting on the side of a flat bed without bedrails? 4  -LN 3  -LN 3  -TERRI    Moving to and from a bed to a chair (including a wheelchair)? 3  -LN 3  -LN 3  -TERRI    Standing up from a chair using your arms (e.g., wheelchair, bedside chair)? 4  -LN 3  -LN 3  -TERRI    Climbing 3-5 steps with a railing? 4  -LN 3  -LN 3   -TERRI    To walk in hospital room? 3  -LN 3  -LN 3  -TERRI    AM-PAC 6 Clicks Score 22  -LN 19  -LN 19  -TERRI    Functional Assessment    Outcome Measure Options AM-PAC 6 Clicks Basic Mobility (PT)  -LN AM-PAC 6 Clicks Basic Mobility (PT)  -LN AM-PAC 6 Clicks Basic Mobility (PT)  -TERRI      11/10/17 0800          How much help from another is currently needed...    Putting on and taking off regular lower body clothing? 3  -NN      Bathing (including washing, rinsing, and drying) 3  -NN      Toileting (which includes using toilet bed pan or urinal) 3  -NN      Putting on and taking off regular upper body clothing 4  -NN      Taking care of personal grooming (such as brushing teeth) 4  -NN      Eating meals 4  -NN      Score 21  -NN      Functional Assessment    Outcome Measure Options AM-PAC 6 Clicks Daily Activity (OT)  -NN        User Key  (r) = Recorded By, (t) = Taken By, (c) = Cosigned By    Initials Name Provider Type    TERRI Joseph, PT Physical Therapist    LN Giuliana De La Torre PTA Physical Therapy Assistant    NN Marcie Frederick, OTR/L Occupational Therapist           Time Calculation:         PT Charges       11/12/17 0855          Time Calculation    Start Time 0855  -LN      Stop Time 0935  -LN      Time Calculation (min) 40 min  -LN      PT Received On 11/12/17  -LN      Time Calculation- PT    Total Timed Code Minutes- PT 40 minute(s)  -LN        User Key  (r) = Recorded By, (t) = Taken By, (c) = Cosigned By    Initials Name Provider Type    LN Giuliana De La Torre PTA Physical Therapy Assistant          Therapy Charges for Today     Code Description Service Date Service Provider Modifiers Qty    02842686347 HC GAIT TRAINING EA 15 MIN 11/11/2017 Giuliana De La Torre PTA GP, KX 1    67913398455 HC PT THERAPEUTIC ACT EA 15 MIN 11/11/2017 Giuliana De La Torre PTA GP, KX 1    96907635527 HC GAIT TRAINING EA 15 MIN 11/12/2017 Giuliana De La Torre PTA GP, KX 1    35752121563 HC PT THERAPEUTIC ACT EA 15 MIN 11/12/2017 Giulianajose d De La Torre PTA GP, KX 1     [id number]  PT THER PROC EA 15 MIN 11/12/2017 Giuliana De La Torre PTA GP, KX 1          PT G-Codes  PT Professional Judgement Used?: Yes  Outcome Measure Options: AM-PAC 6 Clicks Basic Mobility (PT)  Score: 19  Functional Limitation: Mobility: Walking and moving around  Mobility: Walking and Moving Around Current Status (): At least 20 percent but less than 40 percent impaired, limited or restricted  Mobility: Walking and Moving Around Goal Status (): 0 percent impaired, limited or restricted    Giuliana De La Torre PTA  11/12/2017

## 2017-11-12 NOTE — PLAN OF CARE
Problem: Patient Care Overview (Adult)  Goal: Plan of Care Review  Outcome: Ongoing (interventions implemented as appropriate)    11/12/17 0525   Coping/Psychosocial Response Interventions   Plan Of Care Reviewed With patient   Patient Care Overview   Progress improving       Goal: Adult Individualization and Mutuality  Outcome: Ongoing (interventions implemented as appropriate)  Goal: Discharge Needs Assessment  Outcome: Ongoing (interventions implemented as appropriate)    Problem: Arrhythmia/Dysrhythmia (Symptomatic) (Adult)  Goal: Signs and Symptoms of Listed Potential Problems Will be Absent or Manageable (Arrhythmia/Dysrhythmia)  Outcome: Ongoing (interventions implemented as appropriate)    Problem: Diabetes, Type 2 (Adult)  Goal: Signs and Symptoms of Listed Potential Problems Will be Absent or Manageable (Diabetes, Type 2)  Outcome: Ongoing (interventions implemented as appropriate)

## 2017-11-12 NOTE — PLAN OF CARE
Problem: Patient Care Overview (Adult)  Goal: Plan of Care Review  Outcome: Ongoing (interventions implemented as appropriate)  Goal: Adult Individualization and Mutuality  Outcome: Ongoing (interventions implemented as appropriate)  Goal: Discharge Needs Assessment  Outcome: Ongoing (interventions implemented as appropriate)    Problem: Arrhythmia/Dysrhythmia (Symptomatic) (Adult)  Goal: Signs and Symptoms of Listed Potential Problems Will be Absent or Manageable (Arrhythmia/Dysrhythmia)  Outcome: Ongoing (interventions implemented as appropriate)    Problem: Diabetes, Type 2 (Adult)  Goal: Signs and Symptoms of Listed Potential Problems Will be Absent or Manageable (Diabetes, Type 2)  Outcome: Ongoing (interventions implemented as appropriate)

## 2017-11-12 NOTE — PROGRESS NOTES
"[facility]  [address] [place] [address]. [address]  T - [phone]  F - [phone]     Progress Note          PATIENT  DEMOGRAPHICS   PATIENT NAME: May Chavez                      PHYSICIAN: Nereyda Leach MD  : 1960  MRN: [id number]   LOS: 4 days    Patient Care Team:  RONI Travis as PCP - General (Internal Medicine)  Subjective   SUBJECTIVE   Patient tells me that she feels better today.  She does not have any shortness of breath.  She tolerated IV fluids very well.  Her blood pressure still runs on the high side.  She reports no dizziness or lightheadedness.  No recorded orthostatic hypotension         Objective   OBJECTIVE   Vital Signs  Temp:  [96.5 °F (35.8 °C)-98.1 °F (36.7 °C)] 96.5 °F (35.8 °C)  Heart Rate:  [65-76] 65  Resp:  [18] 18  BP: (130-180)/(68-81) 180/73    Flowsheet Rows         First Filed Value    Admission Height  65\" (165.1 cm) Documented at 2017 2355    Admission Weight  217 lb (98.4 kg) Documented at 2017 2355           I/O last 3 completed shifts:  In: 2240 [P.O.:240; I.V.:2000]  Out: -     PHYSICAL EXAM    Physical Exam  Lungs are fairly clear.  Heart regular rate and rhythm  Abdomen soft and tender distended bowel sounds are present.  Extremities no significant edema  RESULTS   Results Review:      Results from last 7 days  Lab Units 17  0608 17  0607 11/10/17  0339  17  0302   SODIUM mmol/L 138 138 139  < > 143   POTASSIUM mmol/L 5.1 4.3 4.3  < > 4.3   CHLORIDE mmol/L 111* 108 110  < > 109   CO2 mmol/L 19.0* 23.0 22.0  < > 22.0   BUN mg/dL 37* 31* 27*  < > 38*   CREATININE mg/dL 1.34* 1.60* 1.17*  < > 1.69*   CALCIUM mg/dL 8.3* 8.1* 8.2*  < > 8.7   BILIRUBIN mg/dL 0.3  --   --   --  0.3   ALK PHOS U/L 58  --   --   --  98   ALT (SGPT) U/L 22  --   --   --  40   AST (SGOT) U/L 19  --   --   --  34   GLUCOSE mg/dL 112* 115* 106*  < > 84   < > = values in this interval not displayed.    Estimated " Creatinine Clearance: 52.5 mL/min (by C-G formula based on Cr of 1.34).      Results from last 7 days  Lab Units 11/08/17  0302   MAGNESIUM mg/dL 2.0               Results from last 7 days  Lab Units 11/12/17  0608 11/11/17  0607 11/10/17  0339 11/09/17  0547 11/08/17  0302   WBC 10*3/mm3 6.83 6.24 6.00 5.17 7.57   HEMOGLOBIN g/dL 9.4* 9.6* 10.5* 10.3* 11.1*   PLATELETS 10*3/mm3 186 176 178 183 184         Results from last 7 days  Lab Units 11/08/17  0302   INR  1.07         Imaging Results (last 24 hours)     Procedure Component Value Units Date/Time    US Renal Limited [[id number]] Collected:  11/12/17 1045     Updated:  11/12/17 1130    Narrative:       EXAMINATION:  ultrasound, renal     CLINICAL INDICATION / HISTORY:  VELVET, Z78.9 Other specified health  status Z74.09 Other reduced mobility R26.89 Other abnormalities  of gait and mobility    COMPARISON:  none    TECHNIQUE:  ultrasound, renal    FULL RESULTS / FINDINGS:    Kidney, right:    Surgically absent    Kidney, left:      size:  normal, measuring 10.8 x 7.2 x 7 cm    echotexture:  normal    no nephrolithiasis, solid mass, or collecting system dilation    Urinary bladder:  grossly within normal limits            Impression:       CONCLUSION:    1.  Right kidney surgically absent.  2.  Otherwise unremarkable renal ultrasound.    Electronically signed by:  Siva Pan MD  11/12/2017 11:29 AM [facility]  Workstation: [id number]           MEDICATIONS      amLODIPine 10 mg Oral Q24H   aspirin 81 mg Oral Daily   citalopram 40 mg Oral Daily   enoxaparin 30 mg Subcutaneous Daily   famotidine 40 mg Oral Daily   glipiZIDE 5 mg Oral QAM AC   insulin aspart 0-9 Units Subcutaneous 4x Daily AC & at Bedtime   labetalol 200 mg Oral Q12H          Assessment/Plan   ASSESSMENT / PLAN    Active Problems:    Syncope    1.  Acute kidney injury, renal function improved.  I suspect that patient has a component of chronic kidney disease stage III most likely related to diabetic/hypertensive  nephrosclerosis.  2.  Blood pressure still not well controlled we'll increase labetalol to 200 mg twice a day.  We will consider adding an ACE inhibitor once renal function is in steady state.  3.  Renal ultrasound results were noted no evidence of acute abnormality  4.  We'll continue to follow with you.                This document has been electronically signed by Nereyda Leach MD on November 12, 2017 2:53 PM

## 2017-11-12 NOTE — DISCHARGE SUMMARY
[address] [address]  T - [phone]     DISCHARGE SUMMARY         PATIENT  DEMOGRAPHICS   PATIENT NAME: May Chavez                      PHYSICIAN: Williams Damon MD  : 1960  MRN: [id number]    ADMISSION/DISCHARGE INFO   ADMISSION DATE: 2017   DISCHARGE DATE: 2017    ADMISSION DIAGNOSES: Syncope [R55]  DISCHARGE DIAGNOSES:     Active Problems:    Syncope      SERVICE:  Medicine       CONSULTS   Consult Orders (all)     Start     Ordered    17 0952  Inpatient Consult to Nephrology  Once     Specialty:  Nephrology  Provider:  Nereyda Leach MD    17 0951    17 0243  Inpatient Consult to Nephrology  Once,   Status:  Canceled     Specialty:  Nephrology  Provider:  (Not yet assigned)    17 0245          PROCEDURES     Imaging Results (last 24 hours)     Procedure Component Value Units Date/Time    US Renal Limited [[id number]] Collected:  17 1045     Updated:  17 1130    Narrative:       EXAMINATION:  ultrasound, renal     CLINICAL INDICATION / HISTORY:  VELVET, Z78.9 Other specified health  status Z74.09 Other reduced mobility R26.89 Other abnormalities  of gait and mobility    COMPARISON:  none    TECHNIQUE:  ultrasound, renal    FULL RESULTS / FINDINGS:    Kidney, right:    Surgically absent    Kidney, left:      size:  normal, measuring 10.8 x 7.2 x 7 cm    echotexture:  normal    no nephrolithiasis, solid mass, or collecting system dilation    Urinary bladder:  grossly within normal limits            Impression:       CONCLUSION:    1.  Right kidney surgically absent.  2.  Otherwise unremarkable renal ultrasound.    Electronically signed by:  Siva Pan MD  2017 11:29 AM [facility]  Workstation: [id number]          Us Renal Limited    Result Date: 2017  Narrative: EXAMINATION:  ultrasound, renal CLINICAL INDICATION / HISTORY:  VELVET, Z78.9 Other specified health status Z74.09 Other reduced  mobility R26.89 Other abnormalities of gait and mobility COMPARISON:  none TECHNIQUE:  ultrasound, renal FULL RESULTS / FINDINGS: Kidney, right:  Surgically absent Kidney, left:    size:  normal, measuring 10.8 x 7.2 x 7 cm   echotexture:  normal   no nephrolithiasis, solid mass, or collecting system dilation Urinary bladder:  grossly within normal limits       Impression: CONCLUSION:  1.  Right kidney surgically absent. 2.  Otherwise unremarkable renal ultrasound. Electronically signed by:  Siva Pan MD  11/12/2017 11:29 AM CST Workstation: [id number]    Us Renal Limited    Result Date: 11/8/2017  Narrative: PROCEDURE: US RENAL LIMITED Indication:  Acute renal failure Technique: Transabdominal Prior Relevant Exam: None Limitations: None Right Kidney: Nephrectomy .   Left Kidney: 11.1 x 5.9 x 5.6   Contour: Normal   Cortical thickness: Normal   Echogenicity: Normal   Hydronephrosis or hydroureter: None Urinary Bladder: Normal Misc: NA     Impression: CONCLUSION: 1. Right nephrectomy. 2. Left kidney normal size and echogenicity. Electronically signed by:  Axel Sharma MD  11/8/2017 12:31 PM CST Workstation: [id number]    Us Carotid Bilateral    Result Date: 11/8/2017  Narrative: Procedure: Bilateral Carotid Doppler Duplex Ultrasound Indication:  Syncope Technique: Grayscale, color Doppler and pulsed Doppler interrogation of the common, internal and external carotid arteries was performed with special attention to the internal carotids. Limited evaluation of the vertebral and subclavian arteries was also performed. Stenosis measurement and validation using NASCET technique. Prior Relevant Exam: None FINDINGS: Limitations: NA Mild to moderate plaque  formation noted in the region of the bulb and proximal internal carotid artery on the right Right                            PSV       Proximal CCA            100.3  cm/s Proximal ICA              92.3  cm/s Mid ICA                       114  cm/s Distal ICA                     200.1  cm/s Vertebral                      72.7  cm/s ICA/CCA                     2.0/1.8  Vertebral                     Antegrade flow Left Proximal CCA            125.8  cm/s Proximal ICA              116.1  cm/s Mid ICA                      112.8  cm/s Distal ICA                   108.4  cm/s Vertebral                     103.1  cm/s ICA/CCA                    0.9/0.8 Vertebral                    Antegrade flow No evidence of abnormal Doppler signal. No evidence of spectral broadening. Internal carotid artery on the right  demonstrates 50-69 % stenosis distally Misc: NA      Impression: 1. 50-69% stenosis distal right internal carotid. No evidence of hemodynamically significant stenosis on the left. 2. Antegrade vertebral flow bilaterally. Electronically signed by:  Axel Sharma MD  11/8/2017 12:30 PM CST Workstation: [email or web address]    Xr Chest Pa & Lateral    Result Date: 11/8/2017  Narrative: Procedure: XR CHEST PA AND LATERAL Indication:  CHF  Technique: Two views . Prior Relevant Exam: None Mild interstitial prominence lung fields bilaterally consistent with interstitial edema or infiltrate. No consolidation or volume loss. No effusion. Cardiomediastinal contour normal. Minimal degenerative changes thoracic spine.     Impression: CONCLUSION: 1. Mild interstitial prominence lung fields bilaterally consistent with interstitial edema or infiltrate. Electronically signed by:  Axel Sharma MD  11/8/2017 12:01 PM CST Workstation: [email or web address]      HISTORY OF PRESENT ILLNESS   May Chavez is a 57 y.o. female with history of hypertension, depression, dyslipidemia, congestive heart failure, diabetes and obesity who was transferred from [facility] to [facility] due to syncope.    In the emergency department patient was noted to have heart rate in the low to mid 40s and was given a dose of atropine with some improvement.  Blood work showed  potassium of 6.3, negative troponin, ECG,  creatinine of 2.43 and BUN of 44.  She also reportedly had a negative CT scan of the head.  She was started on IV hydration, given initial treatment for hyperkalemia and transferred to [facility] for further management.  As at the time  of my assessment patient is hemodynamically stable and her heart rate is in the 70s.    DIAGNOSTIC DATA   Labs  Echo  US      HOSPITAL COURSE   Patient was admitted to the stepdown unit, her heart rate was continuously monitored.  During the stay in the hospital patient heart rate continue stay in 60s and 70s.  Patient had dizziness initially however her blood pressure medications were changed to labetalol and amlodipine.  Her ACE inhibitor's were stopped because of her kidney.  Her creatinine initially improved and worsened therefore nephrology was consulted.  Patient creatinine improved to the baseline of 1.3-1.4.  Patient did have episode of high blood pressure in the hospital, her blood pressure medication were changed.  Patient will be going home on amlodipine 10 mg and labetalol 200 mg.  At the time of discharge patient is stable, no dizziness, no shortness of air, chest pain, lightheadedness.  Patient is informed that before she moves one position to another she should take her time before changing position.  If she has any worsening symptom she needs to contact her PCP or come back to the ER.    Physical Exam   Constitutional: She is oriented to person, place, and time. She appears well-developed and well-nourished.   HENT:   Head: Normocephalic and atraumatic.   Eyes: EOM are normal. Pupils are equal, round, and reactive to light.   Neck: Normal range of motion.   Cardiovascular: Normal rate, regular rhythm and normal heart sounds.    Pulmonary/Chest: Effort normal and breath sounds normal.   Abdominal: Soft. Bowel sounds are normal.   Musculoskeletal: Normal range of motion.   Neurological: She is alert and oriented to person, place, and time.   Skin: Skin is warm.    Psychiatric: She has a normal mood and affect. Her behavior is normal.   Vitals reviewed.         DISCHARGE CONDITION   Stable    DISPOSITION   To home with home health      DISCHARGE MEDICATIONS      May Chavez   Home Medication Instructions ANALIA:[id number]    Printed on:11/12/17 7312   Medication Information                      amLODIPine (NORVASC) 10 MG tablet  Take 1 tablet by mouth Daily.             aspirin 81 MG chewable tablet  Chew 81 mg Daily.             citalopram (CeleXA) 40 MG tablet  Take 40 mg by mouth Daily.             fenofibrate 160 MG tablet  Take 160 mg by mouth Daily.             glyBURIDE (DIAbeta) 5 MG tablet  Take 5 mg by mouth Daily With Breakfast.             labetalol (NORMODYNE) 200 MG tablet  Take 1 tablet by mouth Every 12 (Twelve) Hours.                   INSTRUCTIONS   Activity:   Activity Instructions     Discharge Activity       As tolerated  Monitor with dizziness. Give time before going from one position to next                 Diet:   Diet Instructions     Diet: Cardiac, Renal, Consistent Carbohydrate       Discharge Diet:   Cardiac  Renal  Consistent Carbohydrate                    Special Instructions: Patient instructed to call M.D. or return to ED with worsening shortness of breath, chest pain, fever greater than 100.4°F or any other medical concerns.    FOLLOW UP   Additional Instructions for the Follow-ups that You Need to Schedule     Ambulatory Referral to Home Health    As directed    Face to Face Visit Date:  11/12/2017   Follow-up Provider for Plan of Care?:  I treated the patient in an acute care facility and will not continue treatment after discharge.   Follow-up Provider:  DALLIN DAS   Reason/Clinical Findings:  transitional visit   Describe mobility limitations that make leaving home difficult:  transitional visit   Nursing/Therapeutic Services Requested:  Other Comment - transitional visit    Frequency:  1 Week 1             Follow-up Information      [facility]    Specialty:  Home Health Services    Contact information:    [address]  [facility] [address]  [phone]        Follow up with RONI Travis .    Specialty:  Internal Medicine    Contact information:    [address]  [facility] [address]  [phone]           PENDING TEST RESULTS AT DISCHARGE      TIME   Time: 30 minutes were spent planning this discharge.                      This document has been electronically signed by Williams Damon MD on November 12, 2017 3:21 PM

## 2017-11-12 NOTE — THERAPY TREATMENT NOTE
Acute Care - Occupational Therapy Treatment Note  [facility]     Patient Name: May Chavez  : 1960  MRN: [id number]  Today's Date: 2017  Onset of Illness/Injury or Date of Surgery Date: 17  Date of Referral to OT: 17  Referring Physician: Dr. Williams Damon      Admit Date: 2017    Visit Dx:     ICD-10-CM ICD-9-CM   1. Decreased activities of daily living (ADL) Z78.9 V49.89   2. Impaired physical mobility Z74.09 781.99   3. Impaired gait and mobility R26.89 781.2     Patient Active Problem List   Diagnosis   • Syncope             Adult Rehabilitation Note       17 0942 17 0855 17 1327    Rehab Assessment/Intervention    Discipline occupational therapy assistant  - physical therapy assistant  -LN physical therapy assistant  -LN    Document Type therapy note (daily note)  - therapy note (daily note)  - therapy note (daily note)  -LN    Subjective Information agree to therapy;complains of;weakness;fatigue  - agree to therapy;no complaints  - agree to therapy;no complaints  -LN    Patient Effort, Rehab Treatment fair  -      Precautions/Limitations fall precautions  - fall precautions   vs especially bp  -LN fall precautions;other (see comments)   vs,especially bp  -LN    Recorded by [] KIM Lopez/DIANE [LN] Giuliana De La Torre, PTA [LN] Giuliana De La Torre, PTA    Vital Signs    Pre Systolic BP Rehab 160  -  -  -LN    Pre Treatment Diastolic BP 60  -JH 64  -LN 58  -LN    Intra Systolic BP Rehab  168  -  -LN    Intra Treatment Diastolic BP  68  -LN 70  -LN    Post Systolic BP Rehab  162  -  -LN    Post Treatment Diastolic BP  60  -LN 58  -LN    Pretreatment Heart Rate (beats/min) 70  -JH 68  -LN 71  -LN    Intratreatment Heart Rate (beats/min)  68  -LN 78  -LN    Posttreatment Heart Rate (beats/min)  67  -LN 76  -LN    Pre SpO2 (%) 99  -JH 98  -LN 98  -LN    O2 Delivery Pre Treatment room air  -JH room air  -LN room air  -LN     Intra SpO2 (%)  98  -LN 96  -LN    Post SpO2 (%)  99  -LN 98  -LN    Pre Patient Position  Supine  -LN Supine  -LN    Intra Patient Position  Standing  -LN Standing  -LN    Post Patient Position  Sitting  -LN Sitting  -LN    Recorded by [] KIM Lopez/DIANE [LN] Giuliana De La Torre, PTA [LN] Giuliana De La Torre, PTA    Pain Assessment    Pain Assessment  0-10  -LN 0-10  -LN    Pain Score  0  -LN 0  -LN    Post Pain Score  0  -LN 0  -LN    Recorded by  [LN] Giuliana De La Torre, PTA [LN] Giuliana S Wil, PTA    Cognitive Assessment/Intervention    Current Cognitive/Communication Assessment functional  -      Orientation Status oriented x 4  - oriented to;person;place     - oriented to;person     -    Follows Commands/Answers Questions 100% of the time  -      Recorded by [] KIM Lopez/DIANE [LN] Giuliana S Wil, PTA [LN] Giuliana De La Torre, PTA    Bed Mobility, Assessment/Treatment    Bed Mobility, Assistive Device   bed rails;head of bed elevated  -LN    Bed Mobility, Roll Left, [place]  not tested  -LN not tested  -LN    Bed Mobility, Roll Right, Bryan  not tested  -LN not tested  -LN    Bed Mobility, Scoot/Bridge, [place] independent  - independent  -LN not tested  -LN    Bed Mob, Supine to Sit, [place] independent  - independent  -LN supervision required  -LN    Bed Mob, Sit to Supine, [place]  not tested  -LN not tested  -LN    Recorded by [] KIM Lopez/DIANE [LN] Giuliana De La Torre, PTA [LN] Giuliana S Wil, PTA    Transfer Assessment/Treatment    Transfers, Bed-Chair [place]  not tested  -LN not tested  -LN    Transfers, Chair-Bed Bryan  not tested  -LN not tested  -LN    Transfers, Sit-Stand [place]  independent  -LN supervision required  -LN    Transfers, Stand-Sit Bryan  independent  -LN supervision required  -LN    Transfers, Sit-Stand-Sit, Assist Device  --   none  -LN --   none  -LN    Toilet Transfer, Bryan  not tested  -LN not tested  -LN     Recorded by  [LN] Giuliana De La Torre PTA [LN] Giuliana De La Torre PTA    Gait Assessment/Treatment    Gait, [place] Level  stand by assist;contact guard assist  -LN contact guard assist  -LN    Gait, Assistive Device  --   none  -LN --   none  -LN    Gait, Distance (Feet)  250  -   262  -LN    Gait, Gait Deviations  karri decreased   doesn't walk a straight line but no lob  -LN     Recorded by  [LN] Giuliana De La Torre PTA [LN] Giuliana De La Torre PTA    Stairs Assessment/Treatment    Number of Stairs  5  -LN     Stairs, Handrail Location  both sides  -LN     Stairs, [place] Level  independent  -LN     Stairs, Technique Used  step over step (ascending);step over step (descending)  -LN     Recorded by  [LN] Giuliana De La Torre PTA     Upper Body Bathing Assessment/Training    UB Bathing Assess/Train Assistive Device bath mitt  -      UB Bathing Assess/Train, Position sitting  -      UB Bathing Assess/Train, [place] Level supervision required  -JH      Recorded by [JH] RUBEN Lopez      Upper Body Dressing Assessment/Training    UB Dressing Assess/Train, Clothing Type doffing:;donning:;hospital gown  -      UB Dressing Assess/Train, Position sitting  -      UB Dressing Assess/Train, [place] supervision required  -      Recorded by [JH] RUBEN Lopez      Balance Skills Training    Sitting-Level of Assistance Distant supervision  -      Sitting-Balance Support No upper extremity supported  -JH      Recorded by [JH] RUBEN Lopez      Therapy Exercises    Bilateral Lower Extremities  AROM:;15 reps;sitting;ankle pumps/circles;hip flexion;LAQ  -LN     Bilateral Upper Extremity AROM:;10 reps;sitting;elbow flexion/extension;hand pumps;pronation/supination;shoulder extension/flexion  -      BUE Resistance manual resistance- minimal  -JH      Recorded by [JH] RUBEN Lopez [LN] Giuliana De La Torre PTA     Positioning and Restraints    Post Treatment Position  bed  - bed  -LN    In  Bed  notified nsg;sitting EOB;call light within reach;encouraged to call for assist;with family/caregiver  -LN sitting EOB;call light within reach;encouraged to call for assist;with family/caregiver  -LN    Recorded by  [LN] Giuliana De La Torre, PTA [LN] Giuliana De La Torre, PTA      User Key  (r) = Recorded By, (t) = Taken By, (c) = Cosigned By    Initials Name Effective Dates    LN Giuliana De La Torre, PTA 10/17/16 -      KIM Lopez/DIANE 10/17/16 -                 OT Goals       11/12/17 0942 11/10/17 0814       Transfer Training OT LTG    Transfer Training OT LTG, Date Established  11/10/17  -     Transfer Training OT LTG, Time to Achieve  by discharge  -NN     Transfer Training OT LTG, Activity Type  all transfers  -NN     Transfer Training OT LTG, [place] Level  independent  -NN     Transfer Training OT LTG, Date Goal Reviewed 11/12/17  -      Transfer Training OT LTG, Outcome goal ongoing  -      Patient Education OT LTG    Patient Education OT LTG, Date Established  11/10/17  -     Patient Education OT LTG, Time to Achieve  by discharge  -     Patient Education OT LTG, Education Type  HEP;posture/body mechanics;home safety;energy conservation;work simplification  -     Patient Education OT LTG, Education Understanding  independent;demonstrates adequately;verbalizes understanding  -     Patient Education OT LTG, Date Goal Reviewed 11/12/17  -      Patient Education OT LTG Outcome goal ongoing  -      ADL OT LTG    ADL OT LTG, Date Established  11/10/17  -     ADL OT LTG, Time to Achieve  by discharge  -     ADL OT LTG, Activity Type  ADL skills  -NN     ADL OT LTG, [place] Level  independent  -NN     ADL OT LTG, Date Goal Reviewed 11/12/17  -      ADL OT LTG, Outcome goal ongoing  [facility]        User Key  (r) = Recorded By, (t) = Taken By, (c) = Cosigned By    Initials Name Provider Type    JEOVANNY RiveraA/L Occupational Therapy Assistant    MIKE Frederick OTROHAN/DIANE Occupational  Therapist          Occupational Therapy Education     Title: PT OT SLP Therapies (Active)     Topic: Occupational Therapy (Active)     Point: ADL training (Active)    Description: Instruct learner(s) on proper safety adaptation and remediation techniques during self care or transfers.   Instruct in proper use of assistive devices.    Learning Progress Summary    Learner Readiness Method Response Comment Documented by Status   Patient Acceptance E NR Pt. educated on role of OT, safe t/f, benefit of activity, progression with poc NN 11/10/17 0814 Active               Point: Home exercise program (Active)    Description: Instruct learner(s) on appropriate technique for monitoring, assisting and/or progressing therapeutic exercises/activities.    Learning Progress Summary    Learner Readiness Method Response Comment Documented by Status   Patient Acceptance E NR Pt. educated on role of OT, safe t/f, benefit of activity, progression with poc NN 11/10/17 0814 Active               Point: Body mechanics (Active)    Description: Instruct learner(s) on proper positioning and spine alignment during self-care, functional mobility activities and/or exercises.    Learning Progress Summary    Learner Readiness Method Response Comment Documented by Status   Patient Acceptance E NR Pt. educated on role of OT, safe t/f, benefit of activity, progression with poc NN 11/10/17 0814 Active                      User Key     Initials Effective Dates Name Provider Type Discipline     11/08/17 -  Marcie Frederick OTR/L Occupational Therapist OT                  OT Recommendation and Plan  Anticipated Discharge Disposition: home with assist  Planned Therapy Interventions: activity intolerance, ADL retraining, balance training, bed mobility training, energy conservation, home exercise program, strengthening, transfer training  Therapy Frequency:  (3-14X/WEEK)           Outcome Measures       11/12/17 0855 11/11/17 1327 11/10/17 1455    How  much help from another person do you currently need...    Turning from your back to your side while in flat bed without using bedrails? 4  -LN 4  -LN 4  -TERRI    Moving from lying on back to sitting on the side of a flat bed without bedrails? 4  -LN 3  -LN 3  -TERRI    Moving to and from a bed to a chair (including a wheelchair)? 3  -LN 3  -LN 3  -TERRI    Standing up from a chair using your arms (e.g., wheelchair, bedside chair)? 4  -LN 3  -LN 3  -TERRI    Climbing 3-5 steps with a railing? 4  -LN 3  -LN 3  -TERRI    To walk in hospital room? 3  -LN 3  -LN 3  -TERRI    AM-PAC 6 Clicks Score 22  -LN 19  -LN 19  -TERRI    Functional Assessment    Outcome Measure Options AM-PAC 6 Clicks Basic Mobility (PT)  -LN AM-PAC 6 Clicks Basic Mobility (PT)  -LN AM-PAC 6 Clicks Basic Mobility (PT)  -TERRI      11/10/17 0800          How much help from another is currently needed...    Putting on and taking off regular lower body clothing? 3  -NN      Bathing (including washing, rinsing, and drying) 3  -NN      Toileting (which includes using toilet bed pan or urinal) 3  -NN      Putting on and taking off regular upper body clothing 4  -NN      Taking care of personal grooming (such as brushing teeth) 4  -NN      Eating meals 4  -NN      Score 21  -NN      Functional Assessment    Outcome Measure Options AM-PAC 6 Clicks Daily Activity (OT)  -        User Key  (r) = Recorded By, (t) = Taken By, (c) = Cosigned By    Initials Name Provider Type     Radha Joseph, PT Physical Therapist    LN Giuliana De La Torre, PTA Physical Therapy Assistant    NN Marcie Frederick, OTR/L Occupational Therapist           Time Calculation:         Time Calculation- OT       11/12/17 1249          Time Calculation- OT    OT Start Time 0942  -      OT Stop Time 1040  -      OT Time Calculation (min) 58 min  -      OT Received On 11/12/17  -        User Key  (r) = Recorded By, (t) = Taken By, (c) = Cosigned By    Initials Name Provider Type     Loulou Velasquez, ALVAREZ/L  Occupational Therapy Assistant           Therapy Charges for Today     Code Description Service Date Service Provider Modifiers Qty    06907871553 HC OT ASSTV TECHNICAL ASSMT-15 MIN 11/12/2017 KIM Lopez/L KX 1    31421463205 HC OT THERAPEUTIC ACT EA 15 MIN 11/12/2017 KIM Lopez/L GO, KX 1    69493613756 HC OT THER PROC EA 15 MIN 11/12/2017 KIM Lopez/L GO, KX 1    57896099603 HC OT SELF CARE/MGMT/TRAIN EA 15 MIN 11/12/2017 KIM Lopez/L GO, KX 1    52625170020 HC OT THER SUPP EA 15 MIN 11/12/2017 KIM Lopez/L GO 1          OT G-codes  OT Functional Scales Options: AM-PAC 6 Clicks Daily Activity (OT)  Functional Limitation: Self care  Self Care Current Status (): At least 20 percent but less than 40 percent impaired, limited or restricted  Self Care Goal Status (): At least 1 percent but less than 20 percent impaired, limited or restricted    KIM Lopez/DIANE  11/12/2017

## 2017-11-12 NOTE — PLAN OF CARE
Problem: Inpatient Occupational Therapy  Goal: Transfer Training Goal 1 LTG- OT  Outcome: Ongoing (interventions implemented as appropriate)    11/10/17 0814 11/12/17 0942   Transfer Training OT LTG   Transfer Training OT LTG, Date Established 11/10/17 --    Transfer Training OT LTG, Time to Achieve by discharge --    Transfer Training OT LTG, Activity Type all transfers --    Transfer Training OT LTG, [place] Level independent --    Transfer Training OT LTG, Date Goal Reviewed --  11/12/17   Transfer Training OT LTG, Outcome --  goal ongoing       Goal: Patient Education Goal LTG- OT  Outcome: Ongoing (interventions implemented as appropriate)    11/10/17 0814 11/12/17 0942   Patient Education OT LTG   Patient Education OT LTG, Date Established 11/10/17 --    Patient Education OT LTG, Time to Achieve by discharge --    Patient Education OT LTG, Education Type HEP;posture/body mechanics;home safety;energy conservation;work simplification --    Patient Education OT LTG, Education Understanding independent;demonstrates adequately;verbalizes understanding --    Patient Education OT LTG, Date Goal Reviewed --  11/12/17   Patient Education OT LTG Outcome --  goal ongoing       Goal: ADL Goal LTG- OT  Outcome: Ongoing (interventions implemented as appropriate)    11/10/17 0814 11/12/17 0942   ADL OT LTG   ADL OT LTG, Date Established 11/10/17 --    ADL OT LTG, Time to Achieve by discharge --    ADL OT LTG, Activity Type ADL skills --    ADL OT LTG, [place] Level independent --    ADL OT LTG, Date Goal Reviewed --  11/12/17   ADL OT LTG, Outcome --  goal ongoing

## 2017-11-12 NOTE — PLAN OF CARE
Problem: Patient Care Overview (Adult)  Goal: Plan of Care Review  Outcome: Ongoing (interventions implemented as appropriate)    11/12/17 0855   Coping/Psychosocial Response Interventions   Plan Of Care Reviewed With patient   Patient Care Overview   Progress improving   Outcome Evaluation   Outcome Summary/Follow up Plan sup-sit-stand-sit ind,amb 250' w/o ad and sba/cga of 1,up/down steps ind,2 new goals met-if d/c today would benefit from 24/7 sup and hh pt/ot       Goal: Discharge Needs Assessment  Outcome: Ongoing (interventions implemented as appropriate)    11/10/17 1543 11/11/17 1327   Discharge Needs Assessment   Concerns To Be Addressed denies needs/concerns at this time --    Readmission Within The Last 30 Days no previous admission in last 30 days --    Equipment Needed After Discharge none --    Discharge Facility/Level Of Care Needs --  home with home health   Current Discharge Risk chronically ill --    Discharge Disposition still a patient --    Living Environment   Transportation Available family or friend will provide --    Self-Care   Equipment Currently Used at Home glucometer;grab bar;other (see comments)  (has blood pressure monitior and scales to weigh) --          Problem: Inpatient Physical Therapy  Goal: Transfer Training Goal 1 LTG- PT  Outcome: Ongoing (interventions implemented as appropriate)    11/10/17 1606 11/12/17 0855   Transfer Training PT LTG   Transfer Training PT LTG, Date Established 11/10/17 --    Transfer Training PT LTG, Time to Achieve by discharge --    Transfer Training PT LTG, Activity Type bed to chair /chair to bed;sit to stand/stand to sit --    Transfer Training PT LTG, [place] Level independent --    Transfer Training PT LTG, Date Goal Reviewed --  11/12/17   Transfer Training PT LTG, Outcome --  goal not met       Goal: Gait Training Goal LTG- PT  Outcome: Ongoing (interventions implemented as appropriate)    11/10/17 1606 11/12/17 0855   Gait Training PT LTG    Gait Training Goal PT LTG, Date Established 11/10/17 --    Gait Training Goal PT LTG, Time to Achieve by discharge --    Gait Training Goal PT LTG, [place] Level independent;conditional independence --    Gait Training Goal PT LTG, Distance to Achieve [id number] with VSS --    Gait Training Goal PT LTG, Date Goal Reviewed --  11/12/17   Gait Training Goal PT LTG, Outcome --  goal not met       Goal: Stair Training Goal LTG- PT  Outcome: Outcome(s) achieved Date Met:  11/12/17    11/10/17 1606 11/12/17 0855   Stair Training PT LTG   Stair Training Goal PT LTG, Date Established 11/10/17 --    Stair Training Goal PT LTG, Time to Achieve by discharge --    Stair Training Goal PT LTG, Number of Steps 4 --    Stair Training Goal PT LTG, [place] Level conditional independence --    Stair Training Goal PT LTG, Assist Device 2 handrails --    Stair Training Goal PT LTG, Date Goal Reviewed --  11/12/17   Stair Training Goal PT LTG, Outcome --  goal met       Goal: Strength Goal LTG- PT  Outcome: Ongoing (interventions implemented as appropriate)    11/10/17 1606 11/12/17 0855   Strength Goal PT LTG   Strength Goal PT LTG, Date Established 11/10/17 --    Strength Goal PT LTG, Time to Achieve by discharge --    Strength Goal PT LTG, Measure to Achieve Pt will tolerate 15 reps of AROM exercises in sitting with VSS --    Strength Goal PT LTG, Functional Goal Pt will progress to standing exercises --    Strength Goal PT LTG, Date Goal Reviewed --  11/12/17   Strength Goal PT LTG, Outcome --  goal not met       Goal: Patient Education Goal LTG- PT  Outcome: Outcome(s) achieved Date Met:  11/12/17    11/10/17 1606 11/12/17 0855   Patient Education PT LTG   Patient Education PT LTG, Date Established 11/10/17 --    Patient Education PT LTG, Time to Achieve by discharge --    Patient Education PT LTG, Education Type HEP;gait;transfers;home safety --    Patient Education PT LTG, Date Goal Reviewed --  11/12/17   Patient  Education PT LTG Outcome --  goal met

## 2017-11-12 NOTE — NURSING NOTE
Pt discharged this evening.  AVS faxed to [facility] .  Called to give report, waiting for callback from Ivette.

## 2017-11-13 NOTE — THERAPY DISCHARGE NOTE
Acute Care - Occupational Therapy Initial Eval/Discharge  [facility]     Patient Name: Mya Chavez  : 1960  MRN: [id number]  Today's Date: 2017  Onset of Illness/Injury or Date of Surgery Date: 17  Date of Referral to OT: 17  Referring Physician: Dr. Williams Damon      Admit Date: 2017       ICD-10-CM ICD-9-CM   1. Decreased activities of daily living (ADL) Z78.9 V49.89   2. Impaired physical mobility Z74.09 781.99   3. Impaired gait and mobility R26.89 781.2     Patient Active Problem List   Diagnosis   • Syncope     Past Medical History:   Diagnosis Date   • CHF (congestive heart failure)    • Diabetes mellitus    • Hypertension      Past Surgical History:   Procedure Laterality Date   • NEPHRECTOMY Right           OT ASSESSMENT FLOWSHEET (last 72 hours)      OT Evaluation       17 1028 17 0942 17 0855 17 1415 17 1327    Rehab Evaluation    Document Type  therapy note (daily note)  - therapy note (daily note)  -LN  therapy note (daily note)  -LN    Subjective Information  agree to therapy;complains of;weakness;fatigue  - agree to therapy;no complaints  -LN  agree to therapy;no complaints  -LN    Patient Effort, Rehab Treatment  fair  -       General Information    Precautions/Limitations  fall precautions  - fall precautions   vs especially bp  -LN  fall precautions;other (see comments)   vs,especially bp  -LN    Clinical Impression    Anticipated Discharge Disposition home with home health  -NN        Vital Signs    Pre Systolic BP Rehab  160  -  -LN  144  -LN    Pre Treatment Diastolic BP  60  -JH 64  -LN  58  -LN    Intra Systolic BP Rehab   168  -LN  168  -LN    Intra Treatment Diastolic BP   68  -LN  70  -LN    Post Systolic BP Rehab   162  -LN  158  -LN    Post Treatment Diastolic BP   60  -LN  58  -LN    Pretreatment Heart Rate (beats/min)  70  -JH 68  -LN  71  -LN    Intratreatment Heart Rate (beats/min)   68  -LN  78   -LN    Posttreatment Heart Rate (beats/min)   67  -LN  76  -LN    Pre SpO2 (%)  99  -JH 98  -LN  98  -LN    O2 Delivery Pre Treatment  room air  - room air  -LN  room air  -LN    Intra SpO2 (%)   98  -LN  96  -LN    Post SpO2 (%)   99  -LN  98  -LN    Pre Patient Position   Supine  -LN  Supine  -LN    Intra Patient Position   Standing  -LN  Standing  -LN    Post Patient Position   Sitting  -LN  Sitting  -LN    Pain Assessment    Pain Assessment   0-10  -LN  0-10  -LN    Pain Score   0  -LN  0  -LN    Post Pain Score   0  -LN  0  -LN    Cognitive Assessment/Intervention    Current Cognitive/Communication Assessment  functional  -       Orientation Status  oriented x 4  - oriented to;person;place     -  oriented to;person     -    Follows Commands/Answers Questions  100% of the time  -       Bed Mobility, Assessment/Treatment    Bed Mobility, Assistive Device     bed rails;head of bed elevated  -    Bed Mobility, Roll Left, [place]   not tested  -LN  not tested  -    Bed Mobility, Roll Right, [place]   not tested  -LN  not tested  -    Bed Mobility, Scoot/Bridge, [place]  independent  - independent  -LN  not tested  -LN    Bed Mob, Supine to Sit, [place]  independent  - independent  -  supervision required  -LN    Bed Mob, Sit to Supine, [place]   not tested  -LN  not tested  -LN    Transfer Assessment/Treatment    Transfers, Bed-Chair [place]   not tested  -LN  not tested  -LN    Transfers, Chair-Bed [place]   not tested  -LN  not tested  -LN    Transfers, Sit-Stand [place]   independent  -  supervision required  -LN    Transfers, Stand-Sit [place]   independent  -  supervision required  -LN    Transfers, Sit-Stand-Sit, Assist Device   --   none  -LN  --   none  -LN    Toilet Transfer, [place]   not tested  -LN  not tested  -    Stairs Assessment/Treatment    Number of Stairs   5  -LN      Stairs, Handrail Location   both sides  -       Stairs, [place] Level   independent  -      Stairs, Technique Used   step over step (ascending);step over step (descending)  -      Upper Body Bathing Assessment/Training     Bathing Assess/Train Assistive Device  bath mitt  -       UB Bathing Assess/Train, Position  sitting  -       UB Bathing Assess/Train, [place] Level  supervision required  -       Upper Body Dressing Assessment/Training     Dressing Assess/Train, Clothing Type  doffing:;donning:;hospital gown  -       UB Dressing Assess/Train, Position  sitting  -       UB Dressing Assess/Train, [place]  supervision required  -       Balance Skills Training    Sitting-Level of Assistance  Distant supervision  -       Sitting-Balance Support  No upper extremity supported  -       Therapy Exercises    Bilateral Lower Extremities   AROM:;15 reps;sitting;ankle pumps/circles;hip flexion;LAQ  -      Bilateral Upper Extremity  AROM:;10 reps;sitting;elbow flexion/extension;hand pumps;pronation/supination;shoulder extension/flexion  -       BUE Resistance  manual resistance- [facility]  -       Sensory Assessment/Intervention    LLE Light Touch    not tested  -AB     RLE Light Touch    not tested  -AB     Positioning and Restraints    Post Treatment Position   bed  -  bed  -LN    In Bed   notified nsg;sitting EOB;call light within reach;encouraged to call for assist;with family/caregiver  -LN  sitting EOB;call light within reach;encouraged to call for assist;with family/caregiver  -LN      11/11/17 0715 11/10/17 1543 11/10/17 1541 11/10/17 1455       Rehab Evaluation    Document Type    evaluation  -TERRI     Subjective Information    agree to therapy  -TERRI     Patient Effort, Rehab Treatment    adequate  -TERRI     Symptoms Noted During/After Treatment    significant change in vital signs  -TERRI     Symptoms Noted Comment    BP still elevated;PT completed bed eval only  -TERRI     General Information    Patient Profile Review    yes   -TERRI     Onset of Illness/Injury or Date of Surgery Date    11/08/17  -     Referring Physician    Dr. Williams Damon  -     General Observations    Pt in fowlers;noted IV, telemetry  -TERRI     Pertinent History Of Current Problem    Pt admitted to [facility] after syncopal episode with bradycardia, VELVET with HTN  -TERRI     Precautions/Limitations    fall precautions   vital signs, especially BP  -TERRI     Prior Level of Function    independent:;all household mobility;community mobility;ADL's;home management;cooking;cleaning;dependent:;driving  -TERRI     Equipment Currently Used at Home  glucometer;grab bar;other (see comments)   has blood pressure monitior and scales to weigh  -  grab bar  -     Plans/Goals Discussed With    patient;agreed upon  -     Risks Reviewed    patient:;dizziness;change in vital signs  -     Benefits Reviewed    patient:;improve function;increase independence  -     Barriers to Rehab    none identified  -     Living Environment    Lives With  --   staying with her mother at this time  -  alone  -     [facility]  -     Home Accessibility  bed and bath on same level;ramps present at home;grab bars present (bathtub);tub/shower is not walk in  [facility]  bed and bath on same level;stairs to enter home;ramps present at home  -     Number of Stairs to Enter Home  4  -  4  -     Stair Railings at Home  outside, present at both sides  -  outside, present at both sides  -     Type of Financial/Environmental Concern  none  -       Transportation Available  family or friend will provide  -  family or friend will provide  -     Living Environment Comment    ramp in back  -     Functional Level Prior    Ambulation   0-->independent  -KP      Transferring   0-->independent  -KP      Toileting   0-->independent  -KP      Dressing   0-->independent  -KP      Eating   0-->independent  -      Communication   0-->understands/communicates without difficulty   -KP      Swallowing   0-->swallows foods/liquids without difficulty  -KP      Vital Signs    Pre Systolic BP Rehab    207  -TERRI     Pre Treatment Diastolic BP    87  -TERRI     Post Systolic BP Rehab    202  -TERRI     Post Treatment Diastolic BP    87  -TERRI     Pretreatment Heart Rate (beats/min)    75  -TERRI     Posttreatment Heart Rate (beats/min)    80  -TERRI     Pre SpO2 (%)    98  -TERRI     O2 Delivery Pre Treatment    room air  -TERRI     Post SpO2 (%)    98  -TERRI     O2 Delivery Post Treatment    room air  -TERRI     Pre Patient Position    Supine  -TERRI     Post Patient Position    Supine  -TERRI     Pain Assessment    Pain Assessment    No/denies pain  -TERRI     Vision Assessment/Intervention    Visual Impairment    WFL with corrective lenses  -TERRI     Cognitive Assessment/Intervention    Current Cognitive/Communication Assessment    functional  -TERRI     Orientation Status    oriented x 4  -TERRI     Follows Commands/Answers Questions    100% of the time  -TERRI     Personal Safety    WNL/WFL  -TERRI     Personal Safety Interventions    fall prevention program maintained  -TERRI     ROM (Range of Motion)    General ROM    no range of motion deficits identified  -TERRI     MMT (Manual Muscle Testing)    General MMT Assessment    lower extremity strength deficits identified  -TERRI     General MMT Assessment Detail    Please refer to OT evaluation for BUE strength detail  -TERRI     Bed Mobility, Assessment/Treatment    Bed Mobility, Comment    deferred EOB/OOB assessment due to elevated BP. Noted pt's trunk in midline in fowlers.Pt denies lightheadedness and dizziness.  -TERRI     Transfer Assessment/Treatment    Transfer, Comment    deferred  -TERRI     Sensory Assessment/Intervention    Light Touch    LLE;RLE  -TERRI     LLE Light Touch not tested  -AB   WNL  -TERRI     RLE Light Touch not tested  -AB   WNL  -TERRI     Edema Management    Edema Amount    right:;left:;minimal  -TERRI     Positioning and Restraints    Pre-Treatment Position    in bed  -TERRI     Post Treatment  Position    bed  -TERRI     In Bed    fowlers;call light within reach;encouraged to call for assist  -TERRI     Lower Extremity    Lower Ext Manual Muscle Testing Detail    BLE: 4-/5 ankles/feet, knees, hips  -TERRI       User Key  (r) = Recorded By, (t) = Taken By, (c) = Cosigned By    Initials Name Effective Dates    TERRI Radha TORY Joseph, PT 10/17/16 -     AB Vanesa Zuluaga RN 10/17/16 -     LN Giuliana De La Torre, PTA 10/17/16 -     JH Loulou Velasquez, ALVAREZ/L 10/17/16 -     KP Daphne Fowler 10/17/16 -     NN Marcie Frederick, OTR/L 11/08/17 -           Occupational Therapy Education     Title: PT OT SLP Therapies (Done)     Topic: Occupational Therapy (Resolved)     Point: ADL training (Resolved)    Description: Instruct learner(s) on proper safety adaptation and remediation techniques during self care or transfers.   Instruct in proper use of assistive devices.    Learning Progress Summary    Learner Readiness Method Response Comment Documented by Status   Patient Acceptance E NR Pt. educated on role of OT, safe t/f, benefit of activity, progression with poc  11/10/17 0814 Active               Point: Home exercise program (Resolved)    Description: Instruct learner(s) on appropriate technique for monitoring, assisting and/or progressing therapeutic exercises/activities.    Learning Progress Summary    Learner Readiness Method Response Comment Documented by Status   Patient Acceptance E NR Pt. educated on role of OT, safe t/f, benefit of activity, progression with poc  11/10/17 0814 Active               Point: Body mechanics (Resolved)    Description: Instruct learner(s) on proper positioning and spine alignment during self-care, functional mobility activities and/or exercises.    Learning Progress Summary    Learner Readiness Method Response Comment Documented by Status   Patient Acceptance E NR Pt. educated on role of OT, safe t/f, benefit of activity, progression with poc  11/10/17 0814 Active                      User Key      Initials Effective Dates Name Provider Type Discipline     11/08/17 -  Marcie Frederick, OTR/L Occupational Therapist OT                OT Recommendation and Plan  Anticipated Discharge Disposition: home with home health  Planned Therapy Interventions: activity intolerance, ADL retraining, balance training, bed mobility training, energy conservation, home exercise program, strengthening, transfer training  Therapy Frequency:  (3-14X/WEEK)  Plan of Care Review  Plan Of Care Reviewed With: patient  Progress: progress toward functional goals as expected  Outcome Summary/Follow up Plan: OT eval completed. OT checked with RN and was cleared to see pt. d/t pt. BP had been stable however during eval pt. BP became extremely elevated so no t/f or functional mobility was completed . Pt. however did demonstrate ability to hugo/doff socks EOB independently and complete ROM/MMT EOB. At this time further treatment is deferred until BP becomes more stable. Pt. does cont to benefit from skilled OT d/t decreased activity tolerance,decreased  independence in ADLs. Please monitor pt. v/s closely. 11/10/17 0815           OT Goals       11/13/17 1028 11/12/17 0942 11/10/17 0814    Transfer Training OT LTG    Transfer Training OT LTG, Date Established   11/10/17  -    Transfer Training OT LTG, Time to Achieve   by discharge  -    Transfer Training OT LTG, Activity Type   all transfers  -    Transfer Training OT LTG, [place] Level   independent  -    Transfer Training OT LTG, Date Goal Reviewed 11/13/17  - 11/12/17  -     Transfer Training OT LTG, Outcome goal not met  - goal ongoing  -     Transfer Training OT LTG, Reason Goal Not Met discharged from facility  -      Patient Education OT LTG    Patient Education OT LTG, Date Established   11/10/17  -    Patient Education OT LTG, Time to Achieve   by discharge  -    Patient Education OT LTG, Education Type   HEP;posture/body mechanics;home safety;energy  conservation;work simplification  -    Patient Education OT LTG, Education Understanding   independent;demonstrates adequately;verbalizes understanding  -    Patient Education OT LTG, Date Goal Reviewed 11/13/17  - 11/12/17  -     Patient Education OT LTG Outcome goal not met  - goal ongoing  -     Patient Education OT LTG, Reason Goal Not Met discharged from facility  -      ADL OT LTG    ADL OT LTG, Date Established   11/10/17  -    ADL OT LTG, Time to Achieve   by discharge  -    ADL OT LTG, Activity Type   ADL skills  -    ADL OT LTG, [place] Level   independent  -    ADL OT LTG, Date Goal Reviewed 11/13/17  - 11/12/17  -     ADL OT LTG, Outcome goal not met  - goal ongoing  -     ADL OT LTG, Reason Goal Not Met discharged from facility  -        User Key  (r) = Recorded By, (t) = Taken By, (c) = Cosigned By    Initials Name Provider Type     Loulou Velasquez, ALVAREZ/L Occupational Therapy Assistant     Marcie Frederick OTROHAN/L Occupational Therapist                Outcome Measures       11/12/17 0855 11/11/17 1327 11/10/17 1455    How much help from another person do you currently need...    Turning from your back to your side while in flat bed without using bedrails? 4  -LN 4  -LN 4  -TERRI    Moving from lying on back to sitting on the side of a flat bed without bedrails? 4  -LN 3  -LN 3  -TERRI    Moving to and from a bed to a chair (including a wheelchair)? 3  -LN 3  -LN 3  -TERRI    Standing up from a chair using your arms (e.g., wheelchair, bedside chair)? 4  -LN 3  -LN 3  -TERRI    Climbing 3-5 steps with a railing? 4  -LN 3  -LN 3  -TERRI    To walk in hospital room? 3  -LN 3  -LN 3  -TERRI    AM-PAC 6 Clicks Score 22  -LN 19  -LN 19  -TERRI    Functional Assessment    Outcome Measure Options AM-PAC 6 Clicks Basic Mobility (PT)  -LN AM-PAC 6 Clicks Basic Mobility (PT)  -LN AM-PAC 6 Clicks Basic Mobility (PT)  -TERRI      User Key  (r) = Recorded By, (t) = Taken By, (c) = Cosigned By     Initials Name Provider Type    TERRI Joseph, PT Physical Therapist    DONALD De La Torre, PTA Physical Therapy Assistant          Time Calculation:           OT G-codes  OT Functional Scales Options: AM-PAC 6 Clicks Daily Activity (OT)  Functional Limitation: Self care  Self Care Current Status (): At least 20 percent but less than 40 percent impaired, limited or restricted  Self Care Goal Status (): At least 1 percent but less than 20 percent impaired, limited or restricted    OT Discharge Summary  Anticipated Discharge Disposition: home with home health  Reason for Discharge: Discharge from facility, Per MD order  Outcomes Achieved: Refer to plan of care for updates on goals achieved  Discharge Destination: Home with home health    Marcie Frederick OTR/DIANE  11/13/2017

## 2017-11-13 NOTE — PLAN OF CARE
Problem: Inpatient Occupational Therapy  Goal: Transfer Training Goal 1 LTG- OT  Outcome: Unable to achieve outcome(s) by discharge Date Met:  11/13/17    11/10/17 0814 11/13/17 1028   Transfer Training OT LTG   Transfer Training OT LTG, Date Established 11/10/17 --    Transfer Training OT LTG, Time to Achieve by discharge --    Transfer Training OT LTG, Activity Type all transfers --    Transfer Training OT LTG, [place] Level independent --    Transfer Training OT LTG, Date Goal Reviewed --  11/13/17   Transfer Training OT LTG, Outcome --  goal not met   Transfer Training OT LTG, Reason Goal Not Met --  discharged from facility       Goal: Patient Education Goal LTG- OT  Outcome: Unable to achieve outcome(s) by discharge Date Met:  11/13/17    11/10/17 0814 11/13/17 1028   Patient Education OT LTG   Patient Education OT LTG, Date Established 11/10/17 --    Patient Education OT LTG, Time to Achieve by discharge --    Patient Education OT LTG, Education Type HEP;posture/body mechanics;home safety;energy conservation;work simplification --    Patient Education OT LTG, Education Understanding independent;demonstrates adequately;verbalizes understanding --    Patient Education OT LTG, Date Goal Reviewed --  11/13/17   Patient Education OT LTG Outcome --  goal not met   Patient Education OT LTG, Reason Goal Not Met --  discharged from facility       Goal: ADL Goal LTG- OT  Outcome: Unable to achieve outcome(s) by discharge Date Met:  11/13/17    11/10/17 0814 11/13/17 1028   ADL OT LTG   ADL OT LTG, Date Established 11/10/17 --    ADL OT LTG, Time to Achieve by discharge --    ADL OT LTG, Activity Type ADL skills --    ADL OT LTG, [place] Level independent --    ADL OT LTG, Date Goal Reviewed --  11/13/17   ADL OT LTG, Outcome --  goal not met   ADL OT LTG, Reason Goal Not Met --  discharged from facility

## 2017-11-13 NOTE — THERAPY DISCHARGE NOTE
Acute Care - Physical Therapy Discharge Summary  [facility]       Patient Name: May Chavez  : 1960  MRN: [id number]    Today's Date: 2017  Onset of Illness/Injury or Date of Surgery Date: 17    Date of Referral to PT: 17  Referring Physician: Dr. Williams Damon      Admit Date: 2017      PT Recommendation and Plan    Visit Dx:    ICD-10-CM ICD-9-CM   1. Decreased activities of daily living (ADL) Z78.9 V49.89   2. Impaired physical mobility Z74.09 781.99   3. Impaired gait and mobility R26.89 781.2             Outcome Measures       17 0855 17 1327 11/10/17 1455    How much help from another person do you currently need...    Turning from your back to your side while in flat bed without using bedrails? 4  -LN 4  -LN 4  -TERRI    Moving from lying on back to sitting on the side of a flat bed without bedrails? 4  -LN 3  -LN 3  -TERRI    Moving to and from a bed to a chair (including a wheelchair)? 3  -LN 3  -LN 3  -TERRI    Standing up from a chair using your arms (e.g., wheelchair, bedside chair)? 4  -LN 3  -LN 3  -TERRI    Climbing 3-5 steps with a railing? 4  -LN 3  -LN 3  -TERRI    To walk in hospital room? 3  -LN 3  -LN 3  -TERRI    AM-PAC 6 Clicks Score 22  -LN 19  -LN 19  -TERRI    Functional Assessment    Outcome Measure Options AM-PAC 6 Clicks Basic Mobility (PT)  -LN AM-PAC 6 Clicks Basic Mobility (PT)  -LN AM-PAC 6 Clicks Basic Mobility (PT)  -TERRI      User Key  (r) = Recorded By, (t) = Taken By, (c) = Cosigned By    Initials Name Provider Type    TERRI Joseph, PT Physical Therapist    LN Giuliana De La Torre, PTA Physical Therapy Assistant                      IP PT Goals       17 0822 17 0855 17 1327    Transfer Training PT LTG    Transfer Training PT  LTG, Date Goal Reviewed 17  -MN 17  -LN 17  -LN    Transfer Training PT LTG, Outcome goal not met  -MN goal not met  -LN goal not met  -LN    Transfer Training PT LTG, Reason Goal Not Met  discharged from [facility]  -MN      Gait Training PT LTG    Gait Training Goal PT LTG, Date Goal Reviewed 11/13/17  -MN 11/12/17  -LN 11/11/17  -LN    Gait Training Goal PT LTG, Outcome goal not met  -MN goal not met  -LN goal partially met  -LN    Gait Training Goal PT LTG, Reason Goal Not Met discharged from [facility]  -MN      Stair Training PT LTG    Stair Training Goal PT LTG, Date Goal Reviewed 11/13/17  -MN 11/12/17  -LN 11/11/17  -LN    Stair Training Goal PT LTG, Outcome goal not met  -MN goal met  -LN goal not met  -LN    Stair Training Goal PT LTG, Reason Goal Not Met discharged from [facility]  -MN      Strength Goal PT LTG    Strength Goal PT LTG, Date Goal Reviewed 11/13/17  -MN 11/12/17  -LN 11/11/17  -LN    Strength Goal PT LTG, Outcome goal not met  -MN goal not met  -LN goal not met  -LN    Strength Goal PT LTG, Reason Goal Not Met discharged from [facility]  -MN      Patient Education PT LTG    Patient Education PT LTG, Date Goal Reviewed 11/13/17  -MN 11/12/17  -LN 11/11/17  -LN    Patient Education PT LTG Outcome goal met  -MN goal met  -LN goal not met  -LN    Patient Education PT LTG, Reason Goal Not Met discharged from [facility]  -MN        11/10/17 1606          Transfer Training PT LTG    Transfer Training PT LTG, Date Established 11/10/17  -      Transfer Training PT LTG, Time to Achieve by discharge  -TERRI      Transfer Training PT LTG, Activity Type bed to chair /chair to bed;sit to stand/stand to sit  -TERRI      Transfer Training PT LTG, [place] Level independent  -TERRI      Transfer Training PT LTG, Outcome goal ongoing  -TERRI      Gait Training PT LTG    Gait Training Goal PT LTG, Date Established 11/10/17  -TERRI      Gait Training Goal PT LTG, Time to Achieve by discharge  -TERRI      Gait Training Goal PT LTG, [place] Level independent;conditional independence  -TERRI      Gait Training Goal PT LTG, Distance to Achieve [id number] with VSS  -TERRI      Gait Training Goal PT LTG, Outcome goal ongoing   -      Stair Training PT LTG    Stair Training Goal PT LTG, Date Established 11/10/17  -      Stair Training Goal PT LTG, Time to Achieve by discharge  -      Stair Training Goal PT LTG, Number of Steps 4  -      Stair Training Goal PT LTG, [place] Level conditional independence  -      Stair Training Goal PT LTG, Assist Device 2 handrails  -      Stair Training Goal PT LTG, Outcome goal ongoing  -      Strength Goal PT LTG    Strength Goal PT LTG, Date Established 11/10/17  -      Strength Goal PT LTG, Time to Achieve by discharge  -      Strength Goal PT LTG, Measure to Achieve Pt will tolerate 15 reps of AROM exercises in sitting with VSS  -      Strength Goal PT LTG, Functional Goal Pt will progress to standing exercises  -      Strength Goal PT LTG, Outcome goal ongoing  -      Patient Education PT LTG    Patient Education PT LTG, Date Established 11/10/17  -      Patient Education PT LTG, Time to Achieve by discharge  -      Patient Education PT LTG, Education Type HEP;gait;transfers;home safety  -      Patient Education PT LTG Outcome goal ongoing  -        User Key  (r) = Recorded By, (t) = Taken By, (c) = Cosigned By    Initials Name Provider Type    ADAM Gonzalez, PT Physical Therapist    TERRI Joseph, PT Physical Therapist    DONALD De La Torre, PTA Physical Therapy Assistant              PT Discharge Summary  Anticipated Discharge Disposition: home with home health, home with assist  Reason for Discharge: Discharge from facility, Per MD order  Outcomes Achieved: Patient able to partially acheive established goals  Discharge Destination: Home with home health      Camille Gonzalez, PT   11/13/2017

## 2017-11-13 NOTE — PLAN OF CARE
Problem: Inpatient Physical Therapy  Goal: Transfer Training Goal 1 LTG- PT  Outcome: Unable to achieve outcome(s) by discharge Date Met:  11/13/17    11/10/17 1606 11/13/17 0822   Transfer Training PT LTG   Transfer Training PT LTG, Date Established 11/10/17 --    Transfer Training PT LTG, Time to Achieve by discharge --    Transfer Training PT LTG, Activity Type bed to chair /chair to bed;sit to stand/stand to sit --    Transfer Training PT LTG, [place] Level independent --    Transfer Training PT LTG, Date Goal Reviewed --  11/13/17   Transfer Training PT LTG, Outcome --  goal not met   Transfer Training PT LTG, Reason Goal Not Met --  discharged from facility       Goal: Gait Training Goal LTG- PT  Outcome: Unable to achieve outcome(s) by discharge Date Met:  11/13/17    11/10/17 1606 11/13/17 0822   Gait Training PT LTG   Gait Training Goal PT LTG, Date Established 11/10/17 --    Gait Training Goal PT LTG, Time to Achieve by discharge --    Gait Training Goal PT LTG, [place] Level independent;conditional independence --    Gait Training Goal PT LTG, Distance to Achieve [id number] with VSS --    Gait Training Goal PT LTG, Date Goal Reviewed --  11/13/17   Gait Training Goal PT LTG, Outcome --  goal not met   Gait Training Goal PT LTG, Reason Goal Not Met --  discharged from facility       Goal: Stair Training Goal LTG- PT  Outcome: Outcome(s) achieved Date Met:  11/13/17    11/10/17 1606 11/13/17 0822   Stair Training PT LTG   Stair Training Goal PT LTG, Date Established 11/10/17 --    Stair Training Goal PT LTG, Time to Achieve by discharge --    Stair Training Goal PT LTG, Number of Steps 4 --    Stair Training Goal PT LTG, [place] Level conditional independence --    Stair Training Goal PT LTG, Assist Device 2 handrails --    Stair Training Goal PT LTG, Date Goal Reviewed --  11/13/17   Stair Training Goal PT LTG, Outcome --  goal not met   Stair Training Goal PT LTG, Reason Goal Not Met --   discharged from facility       Goal: Strength Goal LTG- PT  Outcome: Unable to achieve outcome(s) by discharge Date Met:  11/13/17    11/10/17 1606 11/13/17 0822   Strength Goal PT LTG   Strength Goal PT LTG, Date Established 11/10/17 --    Strength Goal PT LTG, Time to Achieve by discharge --    Strength Goal PT LTG, Measure to Achieve Pt will tolerate 15 reps of AROM exercises in sitting with VSS --    Strength Goal PT LTG, Functional Goal Pt will progress to standing exercises --    Strength Goal PT LTG, Date Goal Reviewed --  11/13/17   Strength Goal PT LTG, Outcome --  goal not met   Strength Goal PT LTG, Reason Goal Not Met --  discharged from facility       Goal: Patient Education Goal LTG- PT  Outcome: Outcome(s) achieved Date Met:  11/13/17    11/10/17 1606 11/13/17 0822   Patient Education PT LTG   Patient Education PT LTG, Date Established 11/10/17 --    Patient Education PT LTG, Time to Achieve by discharge --    Patient Education PT LTG, Education Type HEP;gait;transfers;home safety --    Patient Education PT LTG, Date Goal Reviewed --  11/13/17   Patient Education PT LTG Outcome --  goal met   Patient Education PT LTG, Reason Goal Not Met --  discharged from facility